# Patient Record
Sex: MALE | Race: WHITE | NOT HISPANIC OR LATINO | Employment: OTHER | URBAN - METROPOLITAN AREA
[De-identification: names, ages, dates, MRNs, and addresses within clinical notes are randomized per-mention and may not be internally consistent; named-entity substitution may affect disease eponyms.]

---

## 2017-02-16 ENCOUNTER — OFFICE VISIT (OUTPATIENT)
Dept: INTERNAL MEDICINE | Facility: CLINIC | Age: 72
End: 2017-02-16

## 2017-02-16 VITALS
BODY MASS INDEX: 28.86 KG/M2 | HEIGHT: 70 IN | DIASTOLIC BLOOD PRESSURE: 88 MMHG | OXYGEN SATURATION: 98 % | SYSTOLIC BLOOD PRESSURE: 140 MMHG | HEART RATE: 83 BPM | WEIGHT: 201.6 LBS

## 2017-02-16 DIAGNOSIS — I10 ESSENTIAL HYPERTENSION: Primary | ICD-10-CM

## 2017-02-16 DIAGNOSIS — R73.01 IMPAIRED FASTING GLUCOSE: ICD-10-CM

## 2017-02-16 DIAGNOSIS — Z00.00 HEALTHCARE MAINTENANCE: ICD-10-CM

## 2017-02-16 DIAGNOSIS — E78.2 MIXED HYPERLIPIDEMIA: ICD-10-CM

## 2017-02-16 DIAGNOSIS — R97.20 ELEVATED PSA, LESS THAN 10 NG/ML: ICD-10-CM

## 2017-02-16 PROCEDURE — 99214 OFFICE O/P EST MOD 30 MIN: CPT | Performed by: INTERNAL MEDICINE

## 2017-02-16 RX ORDER — SIMVASTATIN 20 MG
20 TABLET ORAL DAILY
Qty: 90 TABLET | Refills: 3 | Status: SHIPPED | OUTPATIENT
Start: 2017-02-16 | End: 2017-10-10 | Stop reason: SDUPTHER

## 2017-02-16 RX ORDER — HYDROCHLOROTHIAZIDE 12.5 MG/1
12.5 TABLET ORAL DAILY
Qty: 90 TABLET | Refills: 3 | Status: SHIPPED | OUTPATIENT
Start: 2017-02-16 | End: 2017-10-10 | Stop reason: SDUPTHER

## 2017-02-16 NOTE — PROGRESS NOTES
Subjective     Jung Blount is a 71 y.o. male, who presents with a chief complaint of   Chief Complaint   Patient presents with   • Establish Care     Has had a MRI for prostate issue.       HPI Comments: Elevated PSA: Patient recently saw Dr. Saavedra and did biopsy and showed some atypical cells and then got an MRI of his prostate which showed an indeterminate lesion. The PSA went 2.3-7.3 in the last year. They are seeing Dr. Rivas on the 24th to discuss results and come up with plan for future. 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     HLD: chronic issue and does well on medication. Tolerates well with no muscle cramps or pains.     HTN: chronic issue and runs 120/70-80 at home. Runs high usually at the doctor. He tolerates medication well with no headache or dizziness.        The following portions of the patient's history were reviewed and updated as appropriate: allergies, current medications, past family history, past medical history, past social history, past surgical history and problem list.    Allergies: Review of patient's allergies indicates no known allergies.    Current Outpatient Prescriptions:   •  B-Complex tablet, Take 1 tablet by mouth daily., Disp: , Rfl:   •  hydrochlorothiazide (HYDRODIURIL) 12.5 MG tablet, Take 1 tablet by mouth Daily., Disp: 90 tablet, Rfl: 3  •  Multiple Vitamin (MULTI VITAMIN DAILY PO), Take 1 tablet by mouth daily., Disp: , Rfl:   •  Omega-3 Fatty Acids (FISH OIL) 1000 MG capsule capsule, Take 1,000 mg by mouth daily with breakfast., Disp: , Rfl:   •  simvastatin (ZOCOR) 20 MG tablet, Take 1 tablet by mouth Daily., Disp: 90 tablet, Rfl: 3  Medications Discontinued During This Encounter   Medication Reason   • hydrochlorothiazide (HYDRODIURIL) 12.5 MG tablet Reorder   • simvastatin (ZOCOR) 20 MG tablet Reorder       Review of Systems   Constitutional: Negative for chills and fever.   HENT: Negative for congestion.   "  Respiratory: Negative for cough and shortness of breath.    Cardiovascular: Negative for chest pain and palpitations.   Gastrointestinal: Negative for constipation, diarrhea and vomiting.   Genitourinary: Negative for difficulty urinating and dysuria.   Musculoskeletal: Negative for joint swelling.   Skin: Negative for rash.   Psychiatric/Behavioral: Negative for decreased concentration and sleep disturbance.       Objective     Visit Vitals   • /88 (BP Location: Left arm, Patient Position: Sitting, Cuff Size: Adult)   • Pulse 83   • Ht 70\" (177.8 cm)   • Wt 201 lb 9.6 oz (91.4 kg)   • SpO2 98%   • BMI 28.93 kg/m2         Physical Exam   Constitutional: He is oriented to person, place, and time. He appears well-developed and well-nourished. No distress.   HENT:   Head: Normocephalic and atraumatic.   Right Ear: External ear normal.   Left Ear: External ear normal.   Mouth/Throat: Oropharynx is clear and moist. No oropharyngeal exudate.   Hearing loss    Eyes: Conjunctivae are normal. Right eye exhibits no discharge. Left eye exhibits no discharge. No scleral icterus.   Neck: Neck supple.   Cardiovascular: Normal rate, regular rhythm and normal heart sounds.  Exam reveals no gallop and no friction rub.    No murmur heard.  Pulmonary/Chest: Effort normal and breath sounds normal. No respiratory distress. He has no wheezes. He has no rales.   Lymphadenopathy:     He has no cervical adenopathy.   Neurological: He is alert and oriented to person, place, and time.   Skin: Skin is warm. No rash noted.   Psychiatric: He has a normal mood and affect. His behavior is normal.   Nursing note and vitals reviewed.        Results for orders placed or performed in visit on 09/15/16   Comprehensive Metabolic Panel   Result Value Ref Range    Glucose 113 (H) 65 - 99 mg/dL    BUN 25 (H) 8 - 23 mg/dL    Creatinine 1.23 0.76 - 1.27 mg/dL    eGFR Non African Am 58 (L) >60 mL/min/1.73    eGFR African Am 70 >60 mL/min/1.73    " BUN/Creatinine Ratio 20.3 7.0 - 25.0    Sodium 142 136 - 145 mmol/L    Potassium 5.4 (H) 3.5 - 5.2 mmol/L    Chloride 100 98 - 107 mmol/L    Total CO2 30.0 (H) 22.0 - 29.0 mmol/L    Calcium 9.8 8.6 - 10.5 mg/dL    Total Protein 7.5 6.0 - 8.5 g/dL    Albumin 4.80 3.50 - 5.20 g/dL    Globulin 2.7 gm/dL    A/G Ratio 1.8 g/dL    Total Bilirubin 0.7 0.1 - 1.2 mg/dL    Alkaline Phosphatase 74 39 - 117 U/L    AST (SGOT) 28 1 - 40 U/L    ALT (SGPT) 31 1 - 41 U/L   Lipid Panel   Result Value Ref Range    Total Cholesterol 210 (H) 0 - 200 mg/dL    Triglycerides 148 0 - 150 mg/dL    HDL Cholesterol 69 (H) 40 - 60 mg/dL    VLDL Cholesterol 29.6 5 - 40 mg/dL    LDL Cholesterol  111 (H) 0 - 100 mg/dL   Microalbumin / Creatinine Urine Ratio   Result Value Ref Range    Creatinine, Urine 78.5 Not Estab. mg/dL    Microalbumin, Urine 6.3 Not Estab. ug/mL    Microalbumin/Creatinine Ratio Urine 8.0 0.0 - 30.0 mg/g creat   TSH   Result Value Ref Range    TSH 1.850 0.270 - 4.200 mIU/mL   PSA   Result Value Ref Range    PSA 7.330 (H) 0.000 - 4.000 ng/mL   CBC & Differential   Result Value Ref Range    WBC 7.25 4.50 - 10.70 10*3/mm3    RBC 5.17 4.60 - 6.00 10*6/mm3    Hemoglobin 15.5 13.7 - 17.6 g/dL    Hematocrit 48.8 40.4 - 52.2 %    MCV 94.4 79.8 - 96.2 fL    MCH 30.0 27.0 - 32.7 pg    MCHC 31.8 (L) 32.6 - 36.4 g/dL    RDW 13.8 11.5 - 14.5 %    Platelets 292 140 - 500 10*3/mm3    Neutrophil Rel % 59.2 42.7 - 76.0 %    Lymphocyte Rel % 27.9 19.6 - 45.3 %    Monocyte Rel % 9.2 5.0 - 12.0 %    Eosinophil Rel % 3.0 0.3 - 6.2 %    Basophil Rel % 0.7 0.0 - 1.5 %    Neutrophils Absolute 4.29 1.90 - 8.10 10*3/mm3    Lymphocytes Absolute 2.02 0.90 - 4.80 10*3/mm3    Monocytes Absolute 0.67 0.20 - 1.20 10*3/mm3    Eosinophils Absolute 0.22 0.00 - 0.70 10*3/mm3    Basophils Absolute 0.05 0.00 - 0.20 10*3/mm3    Immature Granulocyte Rel % 0.0 0.0 - 0.5 %    Immature Grans Absolute 0.00 0.00 - 0.03 10*3/mm3       Assessment/Plan   Jung was seen  today for establish care.    Diagnoses and all orders for this visit:    Essential hypertension  -     Comprehensive Metabolic Panel  -     CBC & Differential  -     Urinalysis With / Culture If Indicated  -     CBC & Differential  -     Comprehensive Metabolic Panel    Mixed hyperlipidemia  -     Lipid Panel With LDL / HDL Ratio  -     Lipid Panel With LDL / HDL Ratio    Elevated PSA, less than 10 ng/ml    Healthcare maintenance  -     Urinalysis With / Culture If Indicated  -     TSH Rfx On Abnormal To Free T4    Impaired fasting glucose    Other orders  -     hydrochlorothiazide (HYDRODIURIL) 12.5 MG tablet; Take 1 tablet by mouth Daily.  -     simvastatin (ZOCOR) 20 MG tablet; Take 1 tablet by mouth Daily.    HTN: HTN is under good control and patient will continue to monitor with home BP cuff. No side effects from medications. Will continue current regimen of HCZT. Will follow up in 6 months.     HLD: Patient's cholesterol is under good control. Will continue current regimen of Zocor. No side effects from medications reported. Will follow up labs in 6 months.     Impaired fasting glucose: elevated in past, but patient is healthy weight and exercises regularly.     Elevated PSA: followed by Dr. Saavedra and has MRI results to follow up with him next week. Will have him send me report of discussion.     Will see back in 6 months for follow up and if any other issues before then, will call.     Return in about 6 months (around 8/16/2017) for Recheck of HLD and HTN .    Jackie Yadav MD  02/16/2017

## 2017-03-22 LAB
ALBUMIN SERPL-MCNC: 4.3 G/DL (ref 3.5–5.2)
ALBUMIN/GLOB SERPL: 1.7 G/DL
ALP SERPL-CCNC: 66 U/L (ref 39–117)
ALT SERPL-CCNC: 22 U/L (ref 1–41)
APPEARANCE UR: CLEAR
AST SERPL-CCNC: 26 U/L (ref 1–40)
BACTERIA #/AREA URNS HPF: ABNORMAL /HPF
BASOPHILS # BLD AUTO: 0.04 10*3/MM3 (ref 0–0.2)
BASOPHILS NFR BLD AUTO: 0.6 % (ref 0–1.5)
BILIRUB SERPL-MCNC: 0.6 MG/DL (ref 0.1–1.2)
BILIRUB UR QL STRIP: NEGATIVE
BUN SERPL-MCNC: 20 MG/DL (ref 8–23)
BUN/CREAT SERPL: 18.3 (ref 7–25)
CALCIUM SERPL-MCNC: 9.4 MG/DL (ref 8.6–10.5)
CHLORIDE SERPL-SCNC: 99 MMOL/L (ref 98–107)
CHOLEST SERPL-MCNC: 175 MG/DL (ref 0–200)
CO2 SERPL-SCNC: 27.9 MMOL/L (ref 22–29)
COLOR UR: YELLOW
CREAT SERPL-MCNC: 1.09 MG/DL (ref 0.76–1.27)
EOSINOPHIL # BLD AUTO: 0.18 10*3/MM3 (ref 0–0.7)
EOSINOPHIL NFR BLD AUTO: 2.6 % (ref 0.3–6.2)
EPI CELLS #/AREA URNS HPF: ABNORMAL /HPF
ERYTHROCYTE [DISTWIDTH] IN BLOOD BY AUTOMATED COUNT: 13.8 % (ref 11.5–14.5)
GLOBULIN SER CALC-MCNC: 2.6 GM/DL
GLUCOSE SERPL-MCNC: 103 MG/DL (ref 65–99)
GLUCOSE UR QL: NEGATIVE
HCT VFR BLD AUTO: 45.7 % (ref 40.4–52.2)
HDLC SERPL-MCNC: 58 MG/DL (ref 40–60)
HGB BLD-MCNC: 14.7 G/DL (ref 13.7–17.6)
HGB UR QL STRIP: ABNORMAL
IMM GRANULOCYTES # BLD: 0 10*3/MM3 (ref 0–0.03)
IMM GRANULOCYTES NFR BLD: 0 % (ref 0–0.5)
KETONES UR QL STRIP: NEGATIVE
LDLC SERPL CALC-MCNC: 94 MG/DL (ref 0–100)
LDLC/HDLC SERPL: 1.62 {RATIO}
LEUKOCYTE ESTERASE UR QL STRIP: NEGATIVE
LYMPHOCYTES # BLD AUTO: 2.12 10*3/MM3 (ref 0.9–4.8)
LYMPHOCYTES NFR BLD AUTO: 31 % (ref 19.6–45.3)
MCH RBC QN AUTO: 30.8 PG (ref 27–32.7)
MCHC RBC AUTO-ENTMCNC: 32.2 G/DL (ref 32.6–36.4)
MCV RBC AUTO: 95.6 FL (ref 79.8–96.2)
MICRO URNS: ABNORMAL
MONOCYTES # BLD AUTO: 0.58 10*3/MM3 (ref 0.2–1.2)
MONOCYTES NFR BLD AUTO: 8.5 % (ref 5–12)
MUCOUS THREADS URNS QL MICRO: PRESENT /HPF
NEUTROPHILS # BLD AUTO: 3.92 10*3/MM3 (ref 1.9–8.1)
NEUTROPHILS NFR BLD AUTO: 57.3 % (ref 42.7–76)
NITRITE UR QL STRIP: NEGATIVE
PH UR STRIP: 6.5 [PH] (ref 5–7.5)
PLATELET # BLD AUTO: 293 10*3/MM3 (ref 140–500)
POTASSIUM SERPL-SCNC: 4.4 MMOL/L (ref 3.5–5.2)
PROT SERPL-MCNC: 6.9 G/DL (ref 6–8.5)
PROT UR QL STRIP: NEGATIVE
RBC # BLD AUTO: 4.78 10*6/MM3 (ref 4.6–6)
RBC #/AREA URNS HPF: >30 /HPF
SODIUM SERPL-SCNC: 140 MMOL/L (ref 136–145)
SP GR UR: 1.01 (ref 1–1.03)
TRIGL SERPL-MCNC: 115 MG/DL (ref 0–150)
URINALYSIS REFLEX: ABNORMAL
UROBILINOGEN UR STRIP-MCNC: 0.2 MG/DL (ref 0.2–1)
VLDLC SERPL CALC-MCNC: 23 MG/DL (ref 5–40)
WBC # BLD AUTO: 6.84 10*3/MM3 (ref 4.5–10.7)
WBC #/AREA URNS HPF: ABNORMAL /HPF

## 2017-03-29 ENCOUNTER — TELEPHONE (OUTPATIENT)
Dept: INTERNAL MEDICINE | Facility: CLINIC | Age: 72
End: 2017-03-29

## 2017-03-29 NOTE — TELEPHONE ENCOUNTER
Patient wife has been advised and voiced understanding. The wife states that all of his tests came back normal and he was negative for prostate cancer, great news! PSA was 4.     ----- Message from Jackie Yadav MD sent at 3/22/2017 11:05 PM EDT -----  Please call patient with these results and let him know that his labs look fantastic! He gets an A+. His cholesterol is significantly lower and his fasting blood sugar is almost normal. He has some blood in his urine which is likely related to the biopsy that Dr. Saavedra did. I still haven't received the note from his office regarding his follow up. How are things with his prostate and any news?  I will plan on seeing him in 8/2017 unless he needs something until then.

## 2017-03-29 NOTE — TELEPHONE ENCOUNTER
----- Message from Nati Chávez sent at 3/29/2017 12:29 PM EDT -----  Regarding: FW: ROXANNE PATIENT  Contact: [phone]  Patient called and asked that they be mailed to him, i mailed results today.    ----- Message -----     From: Rupa Jerome     Sent: 3/29/2017   9:09 AM       To: Tami Voss [facility] [place]  Subject: ROXANNE PATIENT                                   :  [birth year]  ROXANNE PATIENT    PATIENT IS UNABLE TO ACCESS LAB RESULTS AT HOME. PLEASE CALL AND TELL PATIENT HIS RESULTS.

## 2017-08-24 ENCOUNTER — OFFICE VISIT (OUTPATIENT)
Dept: INTERNAL MEDICINE | Facility: CLINIC | Age: 72
End: 2017-08-24

## 2017-08-24 VITALS
WEIGHT: 206.2 LBS | SYSTOLIC BLOOD PRESSURE: 138 MMHG | HEIGHT: 70 IN | BODY MASS INDEX: 29.52 KG/M2 | OXYGEN SATURATION: 97 % | HEART RATE: 76 BPM | DIASTOLIC BLOOD PRESSURE: 82 MMHG

## 2017-08-24 DIAGNOSIS — Z12.11 COLON CANCER SCREENING: ICD-10-CM

## 2017-08-24 DIAGNOSIS — Z13.29 SCREENING FOR HYPOTHYROIDISM: ICD-10-CM

## 2017-08-24 DIAGNOSIS — R73.01 IMPAIRED FASTING GLUCOSE: ICD-10-CM

## 2017-08-24 DIAGNOSIS — Z13.6 SCREENING FOR AAA (ABDOMINAL AORTIC ANEURYSM): ICD-10-CM

## 2017-08-24 DIAGNOSIS — I10 ESSENTIAL HYPERTENSION: Primary | ICD-10-CM

## 2017-08-24 DIAGNOSIS — E78.2 MIXED HYPERLIPIDEMIA: ICD-10-CM

## 2017-08-24 DIAGNOSIS — R97.20 ELEVATED PSA, LESS THAN 10 NG/ML: ICD-10-CM

## 2017-08-24 PROBLEM — K57.30 DIVERTICULA OF COLON: Status: ACTIVE | Noted: 2017-08-24

## 2017-08-24 PROCEDURE — 99214 OFFICE O/P EST MOD 30 MIN: CPT | Performed by: INTERNAL MEDICINE

## 2017-08-24 NOTE — PROGRESS NOTES
Subjective     Jung Blount is a 72 y.o. male, who presents with a chief complaint of   Chief Complaint   Patient presents with   • Follow-up     6 MON f/u    • Hypertension       HPI Comments: 71 yo M here for follow up.     HTN is chronic and doing well with no issues. He tolerates well with no side effects. He takes 12.5mg of HCTZ.     HLD is chronic and does well on medication. No side effects. He has not had labs since 2/2017 and did not get prior to visit.     He is followed by Dr. Saavedra for his elevated PSA and this has been stable.     Needs c-scope in June 2018 to be scheduled with Dr. Roman at [place]            The following portions of the patient's history were reviewed and updated as appropriate: allergies, current medications, past family history, past medical history, past social history, past surgical history and problem list.    Allergies: Review of patient's allergies indicates no known allergies.    Current Outpatient Prescriptions:   •  B-Complex tablet, Take 1 tablet by mouth daily., Disp: , Rfl:   •  FOLIC ACID, Take 400 mcg by mouth., Disp: , Rfl:   •  hydrochlorothiazide (HYDRODIURIL) 12.5 MG tablet, Take 1 tablet by mouth Daily., Disp: 90 tablet, Rfl: 3  •  Multiple Vitamin (MULTI VITAMIN DAILY PO), Take 1 tablet by mouth daily., Disp: , Rfl:   •  Omega-3 Fatty Acids (FISH OIL) 1000 MG capsule capsule, Take 1,000 mg by mouth daily with breakfast., Disp: , Rfl:   •  Selenium 200 MCG capsule, Take 200 mcg by mouth., Disp: , Rfl:   •  simvastatin (ZOCOR) 20 MG tablet, Take 1 tablet by mouth Daily., Disp: 90 tablet, Rfl: 3  There are no discontinued medications.    Review of Systems   Constitutional: Negative for chills and fever.   HENT: Negative for congestion.    Respiratory: Negative for cough.    Cardiovascular: Negative for chest pain, palpitations and leg swelling.   Musculoskeletal: Negative for myalgias.   Neurological: Negative for dizziness and headaches.       Objective     BP  "138/82 (BP Location: Left arm, Patient Position: Sitting, Cuff Size: Adult)  Pulse 76  Ht 70\" (177.8 cm)  Wt 206 lb 3.2 oz (93.5 kg)  SpO2 97%  BMI 29.59 kg/m2      Physical Exam   Constitutional: He is oriented to person, place, and time. He appears well-developed and well-nourished. No distress.   HENT:   Head: Normocephalic and atraumatic.   Right Ear: External ear normal.   Left Ear: External ear normal.   Mouth/Throat: Oropharynx is clear and moist. No oropharyngeal exudate.   Eyes: Conjunctivae are normal. Right eye exhibits no discharge. Left eye exhibits no discharge. No scleral icterus.   Neck: Neck supple.   Cardiovascular: Normal rate, regular rhythm and normal heart sounds.  Exam reveals no gallop and no friction rub.    No murmur heard.  Pulmonary/Chest: Effort normal and breath sounds normal. No respiratory distress. He has no wheezes. He has no rales.   Musculoskeletal: He exhibits no edema or tenderness.   Lymphadenopathy:     He has no cervical adenopathy.   Neurological: He is alert and oriented to person, place, and time.   Skin: Skin is warm. No rash noted.   Psychiatric: He has a normal mood and affect. His behavior is normal.   Nursing note and vitals reviewed.        Results for orders placed or performed in visit on 02/16/17   Comprehensive Metabolic Panel   Result Value Ref Range    Glucose 103 (H) 65 - 99 mg/dL    BUN 20 8 - 23 mg/dL    Creatinine 1.09 0.76 - 1.27 mg/dL    eGFR Non African Am 66 >60 mL/min/1.73    eGFR African Am 80 >60 mL/min/1.73    BUN/Creatinine Ratio 18.3 7.0 - 25.0    Sodium 140 136 - 145 mmol/L    Potassium 4.4 3.5 - 5.2 mmol/L    Chloride 99 98 - 107 mmol/L    Total CO2 27.9 22.0 - 29.0 mmol/L    Calcium 9.4 8.6 - 10.5 mg/dL    Total Protein 6.9 6.0 - 8.5 g/dL    Albumin 4.30 3.50 - 5.20 g/dL    Globulin 2.6 gm/dL    A/G Ratio 1.7 g/dL    Total Bilirubin 0.6 0.1 - 1.2 mg/dL    Alkaline Phosphatase 66 39 - 117 U/L    AST (SGOT) 26 1 - 40 U/L    ALT (SGPT) 22 1 - " 41 U/L   Lipid Panel With LDL / HDL Ratio   Result Value Ref Range    Total Cholesterol 175 0 - 200 mg/dL    Triglycerides 115 0 - 150 mg/dL    HDL Cholesterol 58 40 - 60 mg/dL    VLDL Cholesterol 23 5 - 40 mg/dL    LDL Cholesterol  94 0 - 100 mg/dL    LDL/HDL Ratio 1.62    Urinalysis With / Culture If Indicated   Result Value Ref Range    Specific Gravity, UA 1.009 1.005 - 1.030    pH, UA 6.5 5.0 - 7.5    Color, UA Yellow Yellow    Appearance, UA Clear Clear    Leukocytes, UA Negative Negative    Protein Negative Negative/Trace    Glucose, UA Negative Negative    Ketones Negative Negative    Blood, UA 3+ (A) Negative    Bilirubin, UA Negative Negative    Urobilinogen, UA 0.2 0.2 - 1.0 mg/dL    Nitrite, UA Negative Negative    Microscopic Examination See below:     Urinalysis Reflex Comment    Microscopic Examination   Result Value Ref Range    WBC, UA 0-5 0 - 5 /hpf    RBC, UA >30 (A) 0 - 2 /hpf    Epithelial Cells (non renal) None seen 0 - 10 /hpf    Mucus, UA Present Not Estab. /hpf    Bacteria, UA Few None seen/Few /hpf   CBC & Differential   Result Value Ref Range    WBC 6.84 4.50 - 10.70 10*3/mm3    RBC 4.78 4.60 - 6.00 10*6/mm3    Hemoglobin 14.7 13.7 - 17.6 g/dL    Hematocrit 45.7 40.4 - 52.2 %    MCV 95.6 79.8 - 96.2 fL    MCH 30.8 27.0 - 32.7 pg    MCHC 32.2 (L) 32.6 - 36.4 g/dL    RDW 13.8 11.5 - 14.5 %    Platelets 293 140 - 500 10*3/mm3    Neutrophil Rel % 57.3 42.7 - 76.0 %    Lymphocyte Rel % 31.0 19.6 - 45.3 %    Monocyte Rel % 8.5 5.0 - 12.0 %    Eosinophil Rel % 2.6 0.3 - 6.2 %    Basophil Rel % 0.6 0.0 - 1.5 %    Neutrophils Absolute 3.92 1.90 - 8.10 10*3/mm3    Lymphocytes Absolute 2.12 0.90 - 4.80 10*3/mm3    Monocytes Absolute 0.58 0.20 - 1.20 10*3/mm3    Eosinophils Absolute 0.18 0.00 - 0.70 10*3/mm3    Basophils Absolute 0.04 0.00 - 0.20 10*3/mm3    Immature Granulocyte Rel % 0.0 0.0 - 0.5 %    Immature Grans Absolute 0.00 0.00 - 0.03 10*3/mm3       Assessment/Plan   Jung was seen today  for follow-up and hypertension.    Diagnoses and all orders for this visit:    Essential hypertension  -     CBC & Differential  -     Comprehensive Metabolic Panel    Mixed hyperlipidemia  -     Lipid Panel With LDL / HDL Ratio    Impaired fasting glucose  -     Hemoglobin A1c  -     Urinalysis With / Culture If Indicated    Elevated PSA, less than 10 ng/ml    Screening for hypothyroidism  -     TSH Rfx On Abnormal To Free T4    Screening for AAA (abdominal aortic aneurysm)  -     US AAA Screen Limited    Colon cancer screening  -     Ambulatory Referral For Screening Colonoscopy      HTN is under good control and patient will continue to monitor with home BP cuff. No side effects from medications. Will continue current regimen of HCZT. Will follow up in 6 months      Patient's cholesterol is under good control. Will continue current regimen of Simvastatin. No side effects from medications reported. Will follow up in 6 months to monitor levels.     Patient's BG's have been elevated. Will continue to focus on diet and exercise. Will follow up in 6 months. Labs today and will call with results.     AWV in October and then will have follow up in 6 months with labs at [facility] prior     Dr. Saavedra following elevated PSA closely. Follow up scheduled.     AAA screening to be done when he returns for follow up.     Return in about 5 weeks (around 10/1/2017) for Medicare Wellness.    Jackie Yadav MD  08/24/2017

## 2017-08-25 LAB
ALBUMIN SERPL-MCNC: 4.4 G/DL (ref 3.5–5.2)
ALBUMIN/GLOB SERPL: 1.5 G/DL
ALP SERPL-CCNC: 78 U/L (ref 40–129)
ALT SERPL-CCNC: 35 U/L (ref 5–41)
APPEARANCE UR: CLEAR
AST SERPL-CCNC: 36 U/L (ref 5–40)
BACTERIA #/AREA URNS HPF: NORMAL /HPF
BASOPHILS # BLD AUTO: 0.07 10*3/MM3 (ref 0–0.2)
BASOPHILS NFR BLD AUTO: 0.9 % (ref 0–2)
BILIRUB SERPL-MCNC: 0.6 MG/DL (ref 0.2–1.2)
BILIRUB UR QL STRIP: NEGATIVE
BUN SERPL-MCNC: 18 MG/DL (ref 8–23)
BUN/CREAT SERPL: 17.1 (ref 7–25)
CALCIUM SERPL-MCNC: 9.2 MG/DL (ref 8.8–10.5)
CHLORIDE SERPL-SCNC: 99 MMOL/L (ref 98–107)
CHOLEST SERPL-MCNC: 214 MG/DL (ref 0–200)
CO2 SERPL-SCNC: 26.3 MMOL/L (ref 22–29)
COLOR UR: YELLOW
CREAT SERPL-MCNC: 1.05 MG/DL (ref 0.76–1.27)
EOSINOPHIL # BLD AUTO: 0.22 10*3/MM3 (ref 0.1–0.3)
EOSINOPHIL NFR BLD AUTO: 2.7 % (ref 0–4)
EPI CELLS #/AREA URNS HPF: NORMAL /HPF
ERYTHROCYTE [DISTWIDTH] IN BLOOD BY AUTOMATED COUNT: 13.1 % (ref 11.5–14.5)
GLOBULIN SER CALC-MCNC: 3 GM/DL
GLUCOSE SERPL-MCNC: 106 MG/DL (ref 65–99)
GLUCOSE UR QL: NEGATIVE
HBA1C MFR BLD: 6 % (ref 4.8–5.6)
HCT VFR BLD AUTO: 48.1 % (ref 42–52)
HDLC SERPL-MCNC: 65 MG/DL (ref 40–60)
HGB BLD-MCNC: 15.9 G/DL (ref 14–18)
HGB UR QL STRIP: NEGATIVE
IMM GRANULOCYTES # BLD: 0.02 10*3/MM3 (ref 0–0.03)
IMM GRANULOCYTES NFR BLD: 0.2 % (ref 0–0.5)
KETONES UR QL STRIP: NEGATIVE
LDLC SERPL CALC-MCNC: 106 MG/DL (ref 0–100)
LDLC/HDLC SERPL: 1.63 {RATIO}
LEUKOCYTE ESTERASE UR QL STRIP: NEGATIVE
LYMPHOCYTES # BLD AUTO: 1.97 10*3/MM3 (ref 0.6–4.8)
LYMPHOCYTES NFR BLD AUTO: 24.6 % (ref 20–45)
MCH RBC QN AUTO: 30.5 PG (ref 27–31)
MCHC RBC AUTO-ENTMCNC: 33.1 G/DL (ref 31–37)
MCV RBC AUTO: 92.3 FL (ref 80–94)
MICRO URNS: ABNORMAL
MICRO URNS: ABNORMAL
MONOCYTES # BLD AUTO: 0.65 10*3/MM3 (ref 0–1)
MONOCYTES NFR BLD AUTO: 8.1 % (ref 3–8)
NEUTROPHILS # BLD AUTO: 5.08 10*3/MM3 (ref 1.5–8.3)
NEUTROPHILS NFR BLD AUTO: 63.5 % (ref 45–70)
NITRITE UR QL STRIP: NEGATIVE
NRBC BLD AUTO-RTO: 0 /100 WBC (ref 0–0)
PH UR STRIP: 6.5 [PH] (ref 5–7.5)
PLATELET # BLD AUTO: 316 10*3/MM3 (ref 140–500)
POTASSIUM SERPL-SCNC: 4.4 MMOL/L (ref 3.5–5.2)
PROT SERPL-MCNC: 7.4 G/DL (ref 6–8.5)
PROT UR QL STRIP: NEGATIVE
RBC # BLD AUTO: 5.21 10*6/MM3 (ref 4.7–6.1)
RBC #/AREA URNS HPF: NORMAL /HPF
SODIUM SERPL-SCNC: 139 MMOL/L (ref 136–145)
SP GR UR: <=1.005 (ref 1–1.03)
TRIGL SERPL-MCNC: 216 MG/DL (ref 0–150)
TSH SERPL DL<=0.005 MIU/L-ACNC: 1.63 MIU/ML (ref 0.27–4.2)
URINALYSIS REFLEX: ABNORMAL
UROBILINOGEN UR STRIP-MCNC: 0.2 MG/DL (ref 0.2–1)
VLDLC SERPL CALC-MCNC: 43.2 MG/DL (ref 8–32)
WBC # BLD AUTO: 8.01 10*3/MM3 (ref 4.8–10.8)
WBC #/AREA URNS HPF: NORMAL /HPF

## 2017-08-25 NOTE — PROGRESS NOTES
Please call wife with results and let her know that labs are pretty stable. Her  does have a slight increase in his TG's and LDL which we will talk about at follow up. Reduce sweets, carbs, processed foods and foods high in saturated fat. Pre-diabetes is stable. We will go over all of these at their physical though. No changes currently to medications.

## 2017-08-29 ENCOUNTER — TELEPHONE (OUTPATIENT)
Dept: INTERNAL MEDICINE | Facility: CLINIC | Age: 72
End: 2017-08-29

## 2017-08-29 NOTE — TELEPHONE ENCOUNTER
Patients wife notified, they have seen labs on mychart, will go over with Dr. Yadav at his appt.  ----- Message from Jackie Yadav MD sent at 8/25/2017 10:31 AM EDT -----  Please call wife with results and let her know that labs are pretty stable. Her  does have a slight increase in his TG's and LDL which we will talk about at follow up. Reduce sweets, carbs, processed foods and foods high in saturated fat. Pre-diabetes is stable. We will go over all of these at their physical though. No changes currently to medications.

## 2017-10-10 ENCOUNTER — HOSPITAL ENCOUNTER (OUTPATIENT)
Dept: ULTRASOUND IMAGING | Facility: HOSPITAL | Age: 72
Discharge: HOME | End: 2017-10-10
Attending: INTERNAL MEDICINE | Admitting: INTERNAL MEDICINE

## 2017-10-10 ENCOUNTER — OFFICE VISIT (OUTPATIENT)
Dept: INTERNAL MEDICINE | Facility: CLINIC | Age: 72
End: 2017-10-10

## 2017-10-10 VITALS
BODY MASS INDEX: 30.06 KG/M2 | HEART RATE: 85 BPM | OXYGEN SATURATION: 98 % | WEIGHT: 210 LBS | SYSTOLIC BLOOD PRESSURE: 132 MMHG | HEIGHT: 70 IN | DIASTOLIC BLOOD PRESSURE: 76 MMHG

## 2017-10-10 DIAGNOSIS — I77.819 AORTIC DILATATION (HCC): ICD-10-CM

## 2017-10-10 DIAGNOSIS — R97.20 ELEVATED PSA, LESS THAN 10 NG/ML: ICD-10-CM

## 2017-10-10 DIAGNOSIS — R41.3 MEMORY DEFICITS: ICD-10-CM

## 2017-10-10 DIAGNOSIS — I10 ESSENTIAL HYPERTENSION: Primary | ICD-10-CM

## 2017-10-10 DIAGNOSIS — E78.2 MIXED HYPERLIPIDEMIA: ICD-10-CM

## 2017-10-10 DIAGNOSIS — R41.3 MEMORY DEFICIT: ICD-10-CM

## 2017-10-10 DIAGNOSIS — Z00.00 MEDICARE ANNUAL WELLNESS VISIT, SUBSEQUENT: Primary | ICD-10-CM

## 2017-10-10 DIAGNOSIS — R73.01 IMPAIRED FASTING GLUCOSE: ICD-10-CM

## 2017-10-10 PROCEDURE — 96160 PT-FOCUSED HLTH RISK ASSMT: CPT | Performed by: INTERNAL MEDICINE

## 2017-10-10 PROCEDURE — G0439 PPPS, SUBSEQ VISIT: HCPCS | Performed by: INTERNAL MEDICINE

## 2017-10-10 PROCEDURE — 76706 US ABDL AORTA SCREEN AAA: CPT

## 2017-10-10 PROCEDURE — 99214 OFFICE O/P EST MOD 30 MIN: CPT | Performed by: INTERNAL MEDICINE

## 2017-10-10 RX ORDER — SIMVASTATIN 20 MG
20 TABLET ORAL NIGHTLY
Qty: 90 TABLET | Refills: 3 | Status: SHIPPED | OUTPATIENT
Start: 2017-10-10 | End: 2018-10-16 | Stop reason: SDUPTHER

## 2017-10-10 RX ORDER — HYDROCHLOROTHIAZIDE 12.5 MG/1
12.5 TABLET ORAL DAILY
Qty: 90 TABLET | Refills: 3 | Status: SHIPPED | OUTPATIENT
Start: 2017-10-10 | End: 2018-10-16

## 2017-10-10 NOTE — PATIENT INSTRUCTIONS
"Food Choices to Lower Your Triglycerides  Triglycerides are a type of fat in your blood. High levels of triglycerides can increase the risk of heart disease and stroke. If your triglyceride levels are high, the foods you eat and your eating habits are very important. Choosing the right foods can help lower your triglycerides.   WHAT GENERAL GUIDELINES DO I NEED TO FOLLOW?  · Lose weight if you are overweight.    · Limit or avoid alcohol.    · Fill one half of your plate with vegetables and green salads.    · Limit fruit to two servings a day. Choose fruit instead of juice.    · Make one fourth of your plate whole grains. Look for the word \"whole\" as the first word in the ingredient list.  · Fill one fourth of your plate with lean protein foods.  · Enjoy fatty fish (such as salmon, mackerel, sardines, and tuna) three times a week.    · Choose healthy fats.    · Limit foods high in starch and sugar.  · Eat more home-cooked food and less restaurant, buffet, and fast food.  · Limit fried foods.  · Cook foods using methods other than frying.  · Limit saturated fats.  · Check ingredient lists to avoid foods with partially hydrogenated oils (trans fats) in them.  WHAT FOODS CAN I EAT?   Grains  Whole grains, such as whole wheat or whole grain breads, crackers, cereals, and pasta. Unsweetened oatmeal, bulgur, barley, quinoa, or brown rice. Corn or whole wheat flour tortillas.   Vegetables  Fresh or frozen vegetables (raw, steamed, roasted, or grilled). Green salads.  Fruits  All fresh, canned (in natural juice), or frozen fruits.  Meat and Other Protein Products  Ground beef (85% or leaner), grass-fed beef, or beef trimmed of fat. Skinless chicken or turkey. Ground chicken or turkey. Pork trimmed of fat. All fish and seafood. Eggs. Dried beans, peas, or lentils. Unsalted nuts or seeds. Unsalted canned or dry beans.  Dairy  Low-fat dairy products, such as skim or 1% milk, 2% or reduced-fat cheeses, low-fat ricotta or cottage " cheese, or plain low-fat yogurt.  Fats and Oils  Tub margarines without trans fats. Light or reduced-fat mayonnaise and salad dressings. Avocado. Safflower, olive, or canola oils. Natural peanut or almond butter.  The items listed above may not be a complete list of recommended foods or beverages. Contact your dietitian for more options.  WHAT FOODS ARE NOT RECOMMENDED?   Grains  White bread. White pasta. White rice. Cornbread. Bagels, pastries, and croissants. Crackers that contain trans fat.  Vegetables  White potatoes. Corn. Creamed or fried vegetables. Vegetables in a cheese sauce.  Fruits  Dried fruits. Canned fruit in light or heavy syrup. Fruit juice.  Meat and Other Protein Products  Fatty cuts of meat. Ribs, chicken wings, buitrago, sausage, bologna, salami, chitterlings, fatback, hot dogs, bratwurst, and packaged luncheon meats.  Dairy  Whole or 2% milk, cream, half-and-half, and cream cheese. Whole-fat or sweetened yogurt. Full-fat cheeses. Nondairy creamers and whipped toppings. Processed cheese, cheese spreads, or cheese curds.  Sweets and Desserts  Corn syrup, sugars, honey, and molasses. Candy. Jam and jelly. Syrup. Sweetened cereals. Cookies, pies, cakes, donuts, muffins, and ice cream.  Fats and Oils  Butter, stick margarine, lard, shortening, ghee, or buitrago fat. Coconut, palm kernel, or palm oils.  Beverages  Alcohol. Sweetened drinks (such as sodas, lemonade, and fruit drinks or punches).  The items listed above may not be a complete list of foods and beverages to avoid. Contact your dietitian for more information.     This information is not intended to replace advice given to you by your health care provider. Make sure you discuss any questions you have with your health care provider.     Document Released: 10/05/2005 Document Revised: 01/08/2016 Document Reviewed: 10/22/2014  [employer] Interactive Patient Education ©2017 [employer]    Medicare Wellness  Personal Prevention Plan of Service      Date of Office Visit:  10/10/2017  Encounter Provider:  Jackie Yadav MD  Place of Service:  [facility]  Patient Name: Jung Blount  :  1945    As part of the Medicare Wellness portion of your visit today, we are providing you with this personalized preventive plan of services (PPPS). This plan is based upon recommendations of the [place] Preventive Services Task Force (USPSTF) and the Advisory Committee on Immunization Practices (ACIP).    This lists the preventive care services that should be considered, and provides dates of when you are due. Items listed as completed are up-to-date and do not require any further intervention.    Health Maintenance   Topic Date Due   • ZOSTER VACCINE  09/15/2016   • INFLUENZA VACCINE  2017   • COLONOSCOPY  2018   • LIPID PANEL  2018   • MEDICARE ANNUAL WELLNESS  10/10/2018   • TDAP/TD VACCINES (2 - Td) 2027   • PNEUMOCOCCAL VACCINES (65+ LOW/MEDIUM RISK)  Completed   • AAA SCREEN (ONE-TIME)  Completed       No orders of the defined types were placed in this encounter.      Return in about 6 months (around 4/10/2018) for Recheck.

## 2017-10-10 NOTE — PROGRESS NOTES
QUICK REFERENCE INFORMATION:  The ABCs of the Annual Wellness Visit    Subsequent Medicare Wellness Visit    HEALTH RISK ASSESSMENT    1945    Recent Hospitalizations:  No hospitalization(s) within the last year..        Current Medical Providers:  Patient Care Team:  Jackie Yadav MD as PCP - General (Internal Medicine)  Martin Saavedra MD as Consulting Physician (Urology)        Smoking Status:  History   Smoking Status   • Former Smoker   • Packs/day: 2.00   • Years: 47.00   Smokeless Tobacco   • Not on file     Comment: quit July 2007       Alcohol Consumption:  History   Alcohol Use   • Yes     Comment: Treated for alcoholism in 1991       Depression Screen:   PHQ-2/PHQ-9 Depression Screening 10/10/2017   Little interest or pleasure in doing things 0   Feeling down, depressed, or hopeless 0   Total Score 0       Health Habits and Functional and Cognitive Screening:  Functional & Cognitive Status 10/10/2017   Do you have difficulty preparing food and eating? No   Do you have difficulty bathing yourself? No   Do you have difficulty getting dressed? No   Do you have difficulty using the toilet? No   Do you have difficulty moving around from place to place? No   In the past year have you fallen or experienced a near fall? No   Do you need help using the phone?  No   Are you deaf or do you have serious difficulty hearing?  Yes   Do you need help with transportation? No   Do you need help shopping? No   Do you need help preparing meals?  No   Do you need help with housework?  No   Do you need help with laundry? No   Do you need help taking your medications? No   Do you need help managing money? No   Do you have difficulty concentrating, remembering or making decisions? No       Health Habits  Current Diet: Well Balanced Diet  Dental Exam: Up to date  Eye Exam: Up to date  Exercise (times per week): 5 times per week  Current Exercise Activities Include: (Gym, walking)      Does the patient have evidence of  cognitive impairment? No, per screening, but wife feels that he is forgetful at times     Aspirin use counseling: too late to start for primary prevention. Higher risks of bleeding.       Recent Lab Results:  CMP:  Lab Results   Component Value Date     (H) 08/24/2017    BUN 18 08/24/2017    CREATININE 1.05 08/24/2017    EGFRIFNONA 69 08/24/2017    EGFRIFAFRI 84 08/24/2017    BCR 17.1 08/24/2017     08/24/2017    K 4.4 08/24/2017    CO2 26.3 08/24/2017    CALCIUM 9.2 08/24/2017    PROTENTOTREF 7.4 08/24/2017    ALBUMIN 4.40 08/24/2017    LABGLOBREF 3.0 08/24/2017    LABIL2 1.5 08/24/2017    BILITOT 0.6 08/24/2017    ALKPHOS 78 08/24/2017    AST 36 08/24/2017    ALT 35 08/24/2017     Lipid Panel:  Lab Results   Component Value Date    TRIG 216 (H) 08/24/2017    HDL 65 (H) 08/24/2017    VLDL 43.2 (H) 08/24/2017    LDLHDL 1.63 08/24/2017     HbA1c:  Lab Results   Component Value Date    HGBA1C 6.00 (H) 08/24/2017       Visual Acuity:  No exam data present    Age-appropriate Screening Schedule:  Refer to the list below for future screening recommendations based on patient's age, sex and/or medical conditions. Orders for these recommended tests are listed in the plan section. The patient has been provided with a written plan.    Health Maintenance   Topic Date Due   • ZOSTER VACCINE  09/15/2016   • INFLUENZA VACCINE  08/01/2017   • COLONOSCOPY  06/01/2018   • LIPID PANEL  08/24/2018   • TDAP/TD VACCINES (2 - Td) 08/24/2027   • PNEUMOCOCCAL VACCINES (65+ LOW/MEDIUM RISK)  Completed        Subjective   History of Present Illness    He has had elevated PSA in the past and has been followed by Dr. Saavedra and is going to establish care with Dr. Epperson. Biopsy was all normal with Dr. Saavedra, but needs follow up. Wife to schedule today.     His HTN is chronic and he is doing well. He tolerates diuretic well. He does not have CP or SOB.    His HLD is a chronic issue and he sometimes misses his Zocor. His TG's are up  today on his labs and he has been eating a lot more ice cream and sweets. He has gained weight.     His wife thinks that he has had trouble with his memory lately particularly with places, but not people. Also issues with dates and remembering that he has done something.       Jung Blount is a 72 y.o. male who presents for an Subsequent Wellness Visit.    The following portions of the patient's history were reviewed and updated as appropriate: allergies, current medications, past family history, past medical history, past social history, past surgical history and problem list.    Outpatient Medications Prior to Visit   Medication Sig Dispense Refill   • B-Complex tablet Take 1 tablet by mouth daily.     • Multiple Vitamin (MULTI VITAMIN DAILY PO) Take 1 tablet by mouth daily.     • Omega-3 Fatty Acids (FISH OIL) 1000 MG capsule capsule Take 1,000 mg by mouth daily with breakfast.     • Selenium 200 MCG capsule Take 200 mcg by mouth.     • hydrochlorothiazide (HYDRODIURIL) 12.5 MG tablet Take 1 tablet by mouth Daily. 90 tablet 3   • simvastatin (ZOCOR) 20 MG tablet Take 1 tablet by mouth Daily. 90 tablet 3   • FOLIC ACID Take 400 mcg by mouth.       No facility-administered medications prior to visit.        Patient Active Problem List   Diagnosis   • Hypertension   • Hyperlipidemia   • Medicare annual wellness visit, subsequent   • Elevated PSA, less than 10 ng/ml   • Impaired fasting glucose   • [place] (hard of hearing)   • Diverticula of colon   • Aortic dilatation   • Memory deficits       Advance Care Planning:  has an advance directive - a copy HAS NOT been provided. Have asked the patient to send this to us to add to record.    Identification of Risk Factors:  Risk factors include: weight , unhealthy diet, cardiovascular risk, cognitive impairment, hearing limitations and polypharmacy.    Review of Systems   Constitutional: Negative for chills and fever.   HENT: Negative for congestion.    Respiratory:  Negative for shortness of breath.    Cardiovascular: Negative for chest pain.   Gastrointestinal: Negative for constipation, diarrhea, nausea and vomiting.   Skin: Negative for rash.   Neurological: Negative for dizziness, light-headedness and headaches.        Memory issues   Hematological: Negative for adenopathy.       Compared to one year ago, the patient feels his physical health is the same.  Compared to one year ago, the patient feels his mental health is the same.    Objective     Physical Exam   Constitutional: He is oriented to person, place, and time. He appears well-developed and well-nourished. No distress.   HENT:   Head: Normocephalic and atraumatic.   Right Ear: External ear normal. Decreased hearing is noted.   Left Ear: External ear normal. Decreased hearing is noted.   Mouth/Throat: Uvula is midline, oropharynx is clear and moist and mucous membranes are normal. No oropharyngeal exudate. Tonsils are 0 on the right. Tonsils are 0 on the left. No tonsillar exudate.   Dry skin in ear canal    Eyes: Conjunctivae are normal. Right eye exhibits no discharge. Left eye exhibits no discharge. No scleral icterus.   Neck: Trachea normal. Neck supple. Carotid bruit is not present. No thyroid mass and no thyromegaly present.   Cardiovascular: Normal rate, regular rhythm and normal heart sounds.  Exam reveals no gallop and no friction rub.    No murmur heard.  Pulmonary/Chest: Effort normal and breath sounds normal. No respiratory distress. He has no wheezes. He has no rales.   Abdominal: Soft. Bowel sounds are normal. He exhibits no distension and no mass. There is no tenderness. There is no guarding.   Lymphadenopathy:     He has no cervical adenopathy.   Neurological: He is alert and oriented to person, place, and time. No sensory deficit. He exhibits normal muscle tone. Coordination normal.   Skin: Skin is warm. No rash noted.   Psychiatric: He has a normal mood and affect. His behavior is normal. His mood  "appears not anxious. His affect is not blunt. Cognition and memory are not impaired. He does not express impulsivity or inappropriate judgment. He exhibits normal recent memory and normal remote memory.   Nursing note and vitals reviewed.      Vitals:    10/10/17 1007   BP: 132/76   BP Location: Left arm   Patient Position: Sitting   Cuff Size: Adult   Pulse: 85   SpO2: 98%   Weight: 210 lb (95.3 kg)   Height: 70\" (177.8 cm)   PainSc: 0-No pain       Body mass index is 30.13 kg/(m^2).  Discussed the patient's BMI with him. The BMI is above average; BMI management plan is completed.    Assessment/Plan   Patient Self-Management and Personalized Health Advice  The patient has been provided with information about: diet, exercise, weight management, prevention of cardiac or vascular disease, designing advance directives, supplements and mental health concerns and preventive services including:   · Advance directive, Diabetes screening, see lab orders, Exercise counseling provided, Fall Risk assessment done, Fall Risk plan of care done, Screening for AAA, referral for ultrasound placed, Zostavax vaccine (Herpes Zoster).    Visit Diagnoses:    ICD-10-CM ICD-9-CM   1. Medicare annual wellness visit, subsequent Z00.00 V70.0   2. Memory deficits R41.3 780.93   3. Aortic dilatation I77.819 447.70   4. Elevated PSA, less than 10 ng/ml R97.20 790.93       Orders Placed This Encounter   Procedures   • CT Head Without Contrast     Scheduling Instructions:      [facility]. Please check cost of MRI and if roughly the same, I would prefer the MRI     Order Specific Question:   Reason for Exam:     Answer:   memory deficit       Outpatient Encounter Prescriptions as of 10/10/2017   Medication Sig Dispense Refill   • B-Complex tablet Take 1 tablet by mouth daily.     • hydrochlorothiazide (HYDRODIURIL) 12.5 MG tablet Take 1 tablet by mouth Daily. 90 tablet 3   • Multiple Vitamin (MULTI VITAMIN DAILY PO) " Take 1 tablet by mouth daily.     • Omega-3 Fatty Acids (FISH OIL) 1000 MG capsule capsule Take 1,000 mg by mouth daily with breakfast.     • Selenium 200 MCG capsule Take 200 mcg by mouth.     • simvastatin (ZOCOR) 20 MG tablet Take 1 tablet by mouth Every Night. 90 tablet 3   • [DISCONTINUED] hydrochlorothiazide (HYDRODIURIL) 12.5 MG tablet Take 1 tablet by mouth Daily. 90 tablet 3   • [DISCONTINUED] simvastatin (ZOCOR) 20 MG tablet Take 1 tablet by mouth Daily. 90 tablet 3   • zoster vaccine live (ZOSTAVAX) 21988 UNT/0.65ML reconstituted suspension Inject 19,400 Units under the skin 1 (One) Time for 1 dose. 1 each 0   • [DISCONTINUED] FOLIC ACID Take 400 mcg by mouth.       No facility-administered encounter medications on file as of 10/10/2017.        Reviewed use of high risk medication in the elderly: yes  Reviewed for potential of harmful drug interactions in the elderly: yes    Follow Up:  Return in about 6 months (around 4/10/2018) for Next scheduled follow up.     An After Visit Summary and PPPS with all of these plans were given to the patient.           Patient scored a 26/30 on his MMSE which is borderline. He has been on Aricept in past, but not had workup. With history of alzheimer's in father and alcohol abuse in past, will workup and consider Donepezil in future. Will order CT scan of head (MRI if it is just as expensive at [facility]). TSH is normal and will check B12 with next lab draw. Encouraged him to do memory challenges and puzzles at home.     Patient's cholesterol is not under good control as his TG's are high. Will continue current regimen of Zocor and Omega 3 and work on reducing sweets and high fat foods. No side effects from medications reported. Will follow up in 6 months to monitor levels.     HTN is under good control and patient will continue to monitor with home BP cuff. No side effects from medications. Will continue current regimen of HCTZ. Will follow up in 6 months.      Infrarenal dilation is mild. Will recheck u/s in 2 years. Keep BP under good control and he will alert me if symptoms arise.     All other health maintenance is up to date. Needs flu shot that he will get at wife's office as well as Shingles which I gave them prescription for today.     He will follow up with Dr. Epperson for his elevated PSA. Wife to schedule appointment.

## 2018-03-28 LAB
ALBUMIN SERPL-MCNC: 4.3 G/DL (ref 3.5–5.2)
ALBUMIN/CREAT UR: 3.3 MG/G CREAT (ref 0–30)
ALBUMIN/GLOB SERPL: 1.6 G/DL
ALP SERPL-CCNC: 82 U/L (ref 39–117)
ALT SERPL-CCNC: 32 U/L (ref 1–41)
APPEARANCE UR: CLEAR
AST SERPL-CCNC: 30 U/L (ref 1–40)
BACTERIA #/AREA URNS HPF: ABNORMAL /HPF
BASOPHILS # BLD AUTO: 0.08 10*3/MM3 (ref 0–0.2)
BASOPHILS NFR BLD AUTO: 0.9 % (ref 0–1.5)
BILIRUB SERPL-MCNC: 0.7 MG/DL (ref 0.1–1.2)
BILIRUB UR QL STRIP: NEGATIVE
BUN SERPL-MCNC: 24 MG/DL (ref 8–23)
BUN/CREAT SERPL: 19.2 (ref 7–25)
CALCIUM SERPL-MCNC: 9.9 MG/DL (ref 8.6–10.5)
CASTS URNS MICRO: ABNORMAL
CASTS URNS QL MICRO: PRESENT /LPF
CHLORIDE SERPL-SCNC: 99 MMOL/L (ref 98–107)
CHOLEST SERPL-MCNC: 198 MG/DL (ref 0–200)
CO2 SERPL-SCNC: 28.3 MMOL/L (ref 22–29)
COLOR UR: YELLOW
CREAT SERPL-MCNC: 1.25 MG/DL (ref 0.76–1.27)
CREAT UR-MCNC: 138.3 MG/DL
EOSINOPHIL # BLD AUTO: 0.26 10*3/MM3 (ref 0–0.7)
EOSINOPHIL NFR BLD AUTO: 2.9 % (ref 0.3–6.2)
EPI CELLS #/AREA URNS HPF: ABNORMAL /HPF
ERYTHROCYTE [DISTWIDTH] IN BLOOD BY AUTOMATED COUNT: 14 % (ref 11.5–14.5)
GFR SERPLBLD CREATININE-BSD FMLA CKD-EPI: 56 ML/MIN/1.73
GFR SERPLBLD CREATININE-BSD FMLA CKD-EPI: 68 ML/MIN/1.73
GLOBULIN SER CALC-MCNC: 2.7 GM/DL
GLUCOSE SERPL-MCNC: 118 MG/DL (ref 65–99)
GLUCOSE UR QL: NEGATIVE
HBA1C MFR BLD: 5.4 % (ref 4.8–5.6)
HCT VFR BLD AUTO: 48.1 % (ref 40.4–52.2)
HDLC SERPL-MCNC: 54 MG/DL (ref 40–60)
HGB BLD-MCNC: 14.7 G/DL (ref 13.7–17.6)
HGB UR QL STRIP: NEGATIVE
IMM GRANULOCYTES # BLD: 0.02 10*3/MM3 (ref 0–0.03)
IMM GRANULOCYTES NFR BLD: 0.2 % (ref 0–0.5)
KETONES UR QL STRIP: NEGATIVE
LDLC SERPL CALC-MCNC: 114 MG/DL (ref 0–100)
LDLC/HDLC SERPL: 2.11 {RATIO}
LEUKOCYTE ESTERASE UR QL STRIP: NEGATIVE
LYMPHOCYTES # BLD AUTO: 2.54 10*3/MM3 (ref 0.9–4.8)
LYMPHOCYTES NFR BLD AUTO: 28.2 % (ref 19.6–45.3)
MCH RBC QN AUTO: 30.3 PG (ref 27–32.7)
MCHC RBC AUTO-ENTMCNC: 30.6 G/DL (ref 32.6–36.4)
MCV RBC AUTO: 99.2 FL (ref 79.8–96.2)
MICRO URNS: NORMAL
MICRO URNS: NORMAL
MICROALBUMIN UR-MCNC: 4.5 UG/ML
MONOCYTES # BLD AUTO: 0.71 10*3/MM3 (ref 0.2–1.2)
MONOCYTES NFR BLD AUTO: 7.9 % (ref 5–12)
MUCOUS THREADS URNS QL MICRO: PRESENT /HPF
NEUTROPHILS # BLD AUTO: 5.39 10*3/MM3 (ref 1.9–8.1)
NEUTROPHILS NFR BLD AUTO: 59.9 % (ref 42.7–76)
NITRITE UR QL STRIP: NEGATIVE
PH UR STRIP: 5 [PH] (ref 5–7.5)
PLATELET # BLD AUTO: 316 10*3/MM3 (ref 140–500)
POTASSIUM SERPL-SCNC: 4.7 MMOL/L (ref 3.5–5.2)
PROT SERPL-MCNC: 7 G/DL (ref 6–8.5)
PROT UR QL STRIP: NEGATIVE
RBC # BLD AUTO: 4.85 10*6/MM3 (ref 4.6–6)
RBC #/AREA URNS HPF: ABNORMAL /HPF
SODIUM SERPL-SCNC: 141 MMOL/L (ref 136–145)
SP GR UR: 1.02 (ref 1–1.03)
TRIGL SERPL-MCNC: 151 MG/DL (ref 0–150)
URINALYSIS REFLEX: NORMAL
UROBILINOGEN UR STRIP-MCNC: 0.2 MG/DL (ref 0.2–1)
VIT B12 SERPL-MCNC: 669 PG/ML (ref 211–946)
VLDLC SERPL CALC-MCNC: 30.2 MG/DL (ref 5–40)
WBC # BLD AUTO: 9 10*3/MM3 (ref 4.5–10.7)
WBC #/AREA URNS HPF: ABNORMAL /HPF

## 2018-04-03 ENCOUNTER — OFFICE VISIT (OUTPATIENT)
Dept: INTERNAL MEDICINE | Facility: CLINIC | Age: 73
End: 2018-04-03

## 2018-04-03 VITALS
DIASTOLIC BLOOD PRESSURE: 76 MMHG | OXYGEN SATURATION: 97 % | HEIGHT: 70 IN | RESPIRATION RATE: 16 BRPM | HEART RATE: 89 BPM | WEIGHT: 210 LBS | BODY MASS INDEX: 30.06 KG/M2 | SYSTOLIC BLOOD PRESSURE: 132 MMHG

## 2018-04-03 DIAGNOSIS — L57.0 AK (ACTINIC KERATOSIS): ICD-10-CM

## 2018-04-03 DIAGNOSIS — E78.2 MIXED HYPERLIPIDEMIA: ICD-10-CM

## 2018-04-03 DIAGNOSIS — I10 ESSENTIAL HYPERTENSION: Primary | ICD-10-CM

## 2018-04-03 DIAGNOSIS — R73.01 IMPAIRED FASTING GLUCOSE: ICD-10-CM

## 2018-04-03 PROCEDURE — 99214 OFFICE O/P EST MOD 30 MIN: CPT | Performed by: INTERNAL MEDICINE

## 2018-04-03 NOTE — PROGRESS NOTES
Jung Blount is a 73 y.o. male, who presents with a chief complaint of   Chief Complaint   Patient presents with   • Follow-up     6 month f/u, lab results   • Hypertension       74 yo M here for follow up of HTN, HLD and IFG. He is doing well with no issues.     His HTN is chronic and is on HCTZ. No CP or dizziness. Running good at home.     His HLD is chronic and doing well on Zocor. He tolerates well with no muscle cramps.     Seeing [facility] for elevated PSA and no cancer.     He has been eating more sweets lately. BG's are running higher. He has IFG that is chronic. Still exercising 3 days per week.          The following portions of the patient's history were reviewed and updated as appropriate: allergies, current medications, past family history, past medical history, past social history, past surgical history and problem list.    Allergies: Review of patient's allergies indicates no known allergies.    Current Outpatient Prescriptions:   •  B-Complex tablet, Take 1 tablet by mouth daily., Disp: , Rfl:   •  hydrochlorothiazide (HYDRODIURIL) 12.5 MG tablet, Take 1 tablet by mouth Daily., Disp: 90 tablet, Rfl: 3  •  Multiple Vitamin (MULTI VITAMIN DAILY PO), Take 1 tablet by mouth daily., Disp: , Rfl:   •  Omega-3 Fatty Acids (FISH OIL) 1000 MG capsule capsule, Take 1,000 mg by mouth daily with breakfast., Disp: , Rfl:   •  Selenium 200 MCG capsule, Take 200 mcg by mouth., Disp: , Rfl:   •  simvastatin (ZOCOR) 20 MG tablet, Take 1 tablet by mouth Every Night., Disp: 90 tablet, Rfl: 3  There are no discontinued medications.    Review of Systems   Constitutional: Negative for fatigue and fever.   Respiratory: Negative for cough and shortness of breath.    Cardiovascular: Negative for chest pain and palpitations.   Gastrointestinal: Negative for constipation, diarrhea, nausea and vomiting.   Skin: Negative for rash.             /76 (BP Location: Left arm, Patient Position: Sitting, Cuff Size:  "Adult)   Pulse 89   Resp 16   Ht 177.8 cm (70\")   Wt 95.3 kg (210 lb)   SpO2 97%   BMI 30.13 kg/m²       Physical Exam   Constitutional: He is oriented to person, place, and time. He appears well-developed and well-nourished. No distress.   HENT:   Head: Normocephalic and atraumatic.   Right Ear: External ear normal.   Left Ear: External ear normal.   Mouth/Throat: Oropharynx is clear and moist. No oropharyngeal exudate.   Eyes: Conjunctivae are normal. Right eye exhibits no discharge. Left eye exhibits no discharge. No scleral icterus.   Neck: Neck supple.   Cardiovascular: Normal rate, regular rhythm and normal heart sounds.  Exam reveals no gallop and no friction rub.    No murmur heard.  Pulmonary/Chest: Effort normal and breath sounds normal. No respiratory distress. He has no wheezes. He has no rales.   Abdominal: Soft. Bowel sounds are normal. He exhibits no distension and no mass. There is no tenderness. There is no guarding.   Lymphadenopathy:     He has no cervical adenopathy.   Neurological: He is alert and oriented to person, place, and time.   Skin: Skin is warm. No rash noted.   Psychiatric: He has a normal mood and affect. His behavior is normal.   Nursing note and vitals reviewed.        Results for orders placed or performed in visit on 10/10/17   Hemoglobin A1c   Result Value Ref Range    Hemoglobin A1C 5.40 4.80 - 5.60 %   Lipid Panel With LDL / HDL Ratio   Result Value Ref Range    Total Cholesterol 198 0 - 200 mg/dL    Triglycerides 151 (H) 0 - 150 mg/dL    HDL Cholesterol 54 40 - 60 mg/dL    VLDL Cholesterol 30.2 5 - 40 mg/dL    LDL Cholesterol  114 (H) 0 - 100 mg/dL    LDL/HDL Ratio 2.11    Comprehensive Metabolic Panel   Result Value Ref Range    Glucose 118 (H) 65 - 99 mg/dL    BUN 24 (H) 8 - 23 mg/dL    Creatinine 1.25 0.76 - 1.27 mg/dL    eGFR Non African Am 56 (L) >60 mL/min/1.73    eGFR African Am 68 >60 mL/min/1.73    BUN/Creatinine Ratio 19.2 7.0 - 25.0    Sodium 141 136 - 145 " mmol/L    Potassium 4.7 3.5 - 5.2 mmol/L    Chloride 99 98 - 107 mmol/L    Total CO2 28.3 22.0 - 29.0 mmol/L    Calcium 9.9 8.6 - 10.5 mg/dL    Total Protein 7.0 6.0 - 8.5 g/dL    Albumin 4.30 3.50 - 5.20 g/dL    Globulin 2.7 gm/dL    A/G Ratio 1.6 g/dL    Total Bilirubin 0.7 0.1 - 1.2 mg/dL    Alkaline Phosphatase 82 39 - 117 U/L    AST (SGOT) 30 1 - 40 U/L    ALT (SGPT) 32 1 - 41 U/L   Microalbumin / Creatinine Urine Ratio - Urine, Clean Catch   Result Value Ref Range    Creatinine, Urine 138.3 Not Estab. mg/dL    Microalbumin, Urine 4.5 Not Estab. ug/mL    Microalbumin/Creatinine Ratio 3.3 0.0 - 30.0 mg/g creat   Urinalysis With / Culture If Indicated - Urine, Clean Catch   Result Value Ref Range    Specific Gravity, UA 1.025 1.005 - 1.030    pH, UA 5.0 5.0 - 7.5    Color, UA Yellow Yellow    Appearance, UA Clear Clear    Leukocytes, UA Negative Negative    Protein Negative Negative/Trace    Glucose, UA Negative Negative    Ketones Negative Negative    Blood, UA Negative Negative    Bilirubin, UA Negative Negative    Urobilinogen, UA 0.2 0.2 - 1.0 mg/dL    Nitrite, UA Negative Negative    Microscopic Examination Comment     MICROSCOPIC EXAMINATION See below:     Urinalysis Reflex Comment    Vitamin B12   Result Value Ref Range    Vitamin B-12 669 211 - 946 pg/mL   Microscopic Examination   Result Value Ref Range    WBC, UA 0-5 0 - 5 /hpf    RBC, UA 0-2 0 - 2 /hpf    Epithelial Cells (non renal) None seen 0 - 10 /hpf    Casts Present (A) None seen /lpf    Cast Type Hyaline casts N/A    Mucus, UA Present Not Estab. /hpf    Bacteria, UA None seen None seen/Few /hpf   CBC & Differential   Result Value Ref Range    WBC 9.00 4.50 - 10.70 10*3/mm3    RBC 4.85 4.60 - 6.00 10*6/mm3    Hemoglobin 14.7 13.7 - 17.6 g/dL    Hematocrit 48.1 40.4 - 52.2 %    MCV 99.2 (H) 79.8 - 96.2 fL    MCH 30.3 27.0 - 32.7 pg    MCHC 30.6 (L) 32.6 - 36.4 g/dL    RDW 14.0 11.5 - 14.5 %    Platelets 316 140 - 500 10*3/mm3    Neutrophil Rel %  59.9 42.7 - 76.0 %    Lymphocyte Rel % 28.2 19.6 - 45.3 %    Monocyte Rel % 7.9 5.0 - 12.0 %    Eosinophil Rel % 2.9 0.3 - 6.2 %    Basophil Rel % 0.9 0.0 - 1.5 %    Neutrophils Absolute 5.39 1.90 - 8.10 10*3/mm3    Lymphocytes Absolute 2.54 0.90 - 4.80 10*3/mm3    Monocytes Absolute 0.71 0.20 - 1.20 10*3/mm3    Eosinophils Absolute 0.26 0.00 - 0.70 10*3/mm3    Basophils Absolute 0.08 0.00 - 0.20 10*3/mm3    Immature Granulocyte Rel % 0.2 0.0 - 0.5 %    Immature Grans Absolute 0.02 0.00 - 0.03 10*3/mm3           Jung was seen today for follow-up and hypertension.    Diagnoses and all orders for this visit:    Essential hypertension    Mixed hyperlipidemia    Impaired fasting glucose    Patient's cholesterol is under good control. Will continue current regimen of Simvastatin. No side effects from medications reported. Will follow up in 6 months to monitor levels.     Patient's BG's have been elevated and their HgA1c is 5.4%. Will continue to focus on diet and exercise. Will follow up in 6 months.     HTN is under good control and patient will continue to monitor with home BP cuff. No side effects from medications. Will continue current regimen of HCTZ and consider switching to ACE in future. Will follow up in 6 months      Kidney function is slightly lower. Increase water and avoid NSAID's.     Return in about 6 months (around 10/10/2018) for Medicare Wellness, Recheck.    Jackie Yadav MD  04/03/2018

## 2018-08-26 ENCOUNTER — APPOINTMENT (OUTPATIENT)
Dept: GENERAL RADIOLOGY | Facility: HOSPITAL | Age: 73
End: 2018-08-26

## 2018-08-26 ENCOUNTER — APPOINTMENT (OUTPATIENT)
Dept: CT IMAGING | Facility: HOSPITAL | Age: 73
End: 2018-08-26

## 2018-08-26 ENCOUNTER — HOSPITAL ENCOUNTER (INPATIENT)
Facility: HOSPITAL | Age: 73
LOS: 4 days | Discharge: HOME | End: 2018-08-30
Attending: EMERGENCY MEDICINE | Admitting: INTERNAL MEDICINE

## 2018-08-26 DIAGNOSIS — R41.82 ALTERED MENTAL STATUS, UNSPECIFIED ALTERED MENTAL STATUS TYPE: Primary | ICD-10-CM

## 2018-08-26 DIAGNOSIS — J18.9 PNEUMONIA OF RIGHT LOWER LOBE DUE TO INFECTIOUS ORGANISM: ICD-10-CM

## 2018-08-26 DIAGNOSIS — K57.92 DIVERTICULITIS OF INTESTINE WITHOUT PERFORATION OR ABSCESS WITHOUT BLEEDING, UNSPECIFIED PART OF INTESTINAL TRACT: ICD-10-CM

## 2018-08-26 DIAGNOSIS — R10.9 ABDOMINAL PAIN, UNSPECIFIED ABDOMINAL LOCATION: ICD-10-CM

## 2018-08-26 DIAGNOSIS — K75.0 LIVER ABSCESS: ICD-10-CM

## 2018-08-26 DIAGNOSIS — D72.829 LEUKOCYTOSIS, UNSPECIFIED TYPE: ICD-10-CM

## 2018-08-26 PROBLEM — R79.89 ELEVATED LFTS: Status: ACTIVE | Noted: 2018-08-26

## 2018-08-26 PROBLEM — R41.0 CONFUSION: Status: ACTIVE | Noted: 2018-08-26

## 2018-08-26 PROBLEM — N18.30 CKD (CHRONIC KIDNEY DISEASE) STAGE 3, GFR 30-59 ML/MIN (HCC): Status: ACTIVE | Noted: 2018-08-26

## 2018-08-26 PROBLEM — R53.1 WEAKNESS: Status: ACTIVE | Noted: 2018-08-26

## 2018-08-26 LAB
ABO GROUP BLD: NORMAL
ALBUMIN SERPL-MCNC: 4.2 G/DL (ref 3.5–5.2)
ALBUMIN/GLOB SERPL: 1.4 G/DL
ALP SERPL-CCNC: 92 U/L (ref 39–117)
ALT SERPL W P-5'-P-CCNC: 53 U/L (ref 1–41)
AMPHET+METHAMPHET UR QL: NEGATIVE
ANION GAP SERPL CALCULATED.3IONS-SCNC: 11.9 MMOL/L
APTT PPP: 21.8 SECONDS (ref 22.7–35.4)
AST SERPL-CCNC: 59 U/L (ref 1–40)
BACTERIA UR QL AUTO: NORMAL /HPF
BARBITURATES UR QL SCN: NEGATIVE
BASOPHILS # BLD AUTO: 0.02 10*3/MM3 (ref 0–0.2)
BASOPHILS NFR BLD AUTO: 0.1 % (ref 0–1.5)
BENZODIAZ UR QL SCN: NEGATIVE
BILIRUB SERPL-MCNC: 0.6 MG/DL (ref 0.1–1.2)
BILIRUB UR QL STRIP: NEGATIVE
BLD GP AB SCN SERPL QL: NEGATIVE
BUN BLD-MCNC: 22 MG/DL (ref 8–23)
BUN/CREAT SERPL: 17.3 (ref 7–25)
CALCIUM SPEC-SCNC: 9.1 MG/DL (ref 8.6–10.5)
CANNABINOIDS SERPL QL: NEGATIVE
CHLORIDE SERPL-SCNC: 100 MMOL/L (ref 98–107)
CLARITY UR: CLEAR
CO2 SERPL-SCNC: 27.1 MMOL/L (ref 22–29)
COCAINE UR QL: NEGATIVE
COLOR UR: YELLOW
CREAT BLD-MCNC: 1.27 MG/DL (ref 0.76–1.27)
D-LACTATE SERPL-SCNC: 1.7 MMOL/L (ref 0.5–2)
DEPRECATED RDW RBC AUTO: 46.3 FL (ref 37–54)
EOSINOPHIL # BLD AUTO: 0.03 10*3/MM3 (ref 0–0.7)
EOSINOPHIL NFR BLD AUTO: 0.2 % (ref 0.3–6.2)
ERYTHROCYTE [DISTWIDTH] IN BLOOD BY AUTOMATED COUNT: 13.6 % (ref 11.5–14.5)
GFR SERPL CREATININE-BSD FRML MDRD: 55 ML/MIN/1.73
GLOBULIN UR ELPH-MCNC: 3 GM/DL
GLUCOSE BLD-MCNC: 123 MG/DL (ref 65–99)
GLUCOSE UR STRIP-MCNC: NEGATIVE MG/DL
HCT VFR BLD AUTO: 44.3 % (ref 40.4–52.2)
HGB BLD-MCNC: 14.8 G/DL (ref 13.7–17.6)
HGB UR QL STRIP.AUTO: NEGATIVE
HYALINE CASTS UR QL AUTO: NORMAL /LPF
IMM GRANULOCYTES # BLD: 0.04 10*3/MM3 (ref 0–0.03)
IMM GRANULOCYTES NFR BLD: 0.2 % (ref 0–0.5)
INR PPP: 1.04 (ref 0.9–1.1)
KETONES UR QL STRIP: NEGATIVE
LEUKOCYTE ESTERASE UR QL STRIP.AUTO: ABNORMAL
LIPASE SERPL-CCNC: 24 U/L (ref 13–60)
LYMPHOCYTES # BLD AUTO: 0.71 10*3/MM3 (ref 0.9–4.8)
LYMPHOCYTES NFR BLD AUTO: 4.1 % (ref 19.6–45.3)
MCH RBC QN AUTO: 31 PG (ref 27–32.7)
MCHC RBC AUTO-ENTMCNC: 33.4 G/DL (ref 32.6–36.4)
MCV RBC AUTO: 92.9 FL (ref 79.8–96.2)
METHADONE UR QL SCN: NEGATIVE
MONOCYTES # BLD AUTO: 1.16 10*3/MM3 (ref 0.2–1.2)
MONOCYTES NFR BLD AUTO: 6.6 % (ref 5–12)
NEUTROPHILS # BLD AUTO: 15.61 10*3/MM3 (ref 1.9–8.1)
NEUTROPHILS NFR BLD AUTO: 89 % (ref 42.7–76)
NITRITE UR QL STRIP: NEGATIVE
OPIATES UR QL: NEGATIVE
OXYCODONE UR QL SCN: NEGATIVE
PH UR STRIP.AUTO: 6 [PH] (ref 5–8)
PLATELET # BLD AUTO: 236 10*3/MM3 (ref 140–500)
PMV BLD AUTO: 10.8 FL (ref 6–12)
POTASSIUM BLD-SCNC: 3.7 MMOL/L (ref 3.5–5.2)
PROT SERPL-MCNC: 7.2 G/DL (ref 6–8.5)
PROT UR QL STRIP: NEGATIVE
PROTHROMBIN TIME: 13.4 SECONDS (ref 11.7–14.2)
RBC # BLD AUTO: 4.77 10*6/MM3 (ref 4.6–6)
RBC # UR: NORMAL /HPF
REF LAB TEST METHOD: NORMAL
RH BLD: NEGATIVE
SODIUM BLD-SCNC: 139 MMOL/L (ref 136–145)
SP GR UR STRIP: 1.02 (ref 1–1.03)
SQUAMOUS #/AREA URNS HPF: NORMAL /HPF
T&S EXPIRATION DATE: NORMAL
TROPONIN T SERPL-MCNC: <0.01 NG/ML (ref 0–0.03)
UROBILINOGEN UR QL STRIP: ABNORMAL
WBC NRBC COR # BLD: 17.53 10*3/MM3 (ref 4.5–10.7)
WBC UR QL AUTO: NORMAL /HPF

## 2018-08-26 PROCEDURE — 87185 SC STD ENZYME DETCJ PER NZM: CPT | Performed by: EMERGENCY MEDICINE

## 2018-08-26 PROCEDURE — 25010000002 PIPERACILLIN SOD-TAZOBACTAM PER 1 G: Performed by: EMERGENCY MEDICINE

## 2018-08-26 PROCEDURE — 25010000002 IOPAMIDOL 61 % SOLUTION: Performed by: EMERGENCY MEDICINE

## 2018-08-26 PROCEDURE — 86850 RBC ANTIBODY SCREEN: CPT | Performed by: EMERGENCY MEDICINE

## 2018-08-26 PROCEDURE — 70450 CT HEAD/BRAIN W/O DYE: CPT

## 2018-08-26 PROCEDURE — 86900 BLOOD TYPING SEROLOGIC ABO: CPT | Performed by: EMERGENCY MEDICINE

## 2018-08-26 PROCEDURE — 80307 DRUG TEST PRSMV CHEM ANLYZR: CPT | Performed by: EMERGENCY MEDICINE

## 2018-08-26 PROCEDURE — 71046 X-RAY EXAM CHEST 2 VIEWS: CPT

## 2018-08-26 PROCEDURE — 85025 COMPLETE CBC W/AUTO DIFF WBC: CPT | Performed by: EMERGENCY MEDICINE

## 2018-08-26 PROCEDURE — 83605 ASSAY OF LACTIC ACID: CPT | Performed by: EMERGENCY MEDICINE

## 2018-08-26 PROCEDURE — 80053 COMPREHEN METABOLIC PANEL: CPT | Performed by: EMERGENCY MEDICINE

## 2018-08-26 PROCEDURE — 93010 ELECTROCARDIOGRAM REPORT: CPT | Performed by: INTERNAL MEDICINE

## 2018-08-26 PROCEDURE — 81001 URINALYSIS AUTO W/SCOPE: CPT | Performed by: EMERGENCY MEDICINE

## 2018-08-26 PROCEDURE — 93005 ELECTROCARDIOGRAM TRACING: CPT | Performed by: EMERGENCY MEDICINE

## 2018-08-26 PROCEDURE — 87076 CULTURE ANAEROBE IDENT EACH: CPT | Performed by: EMERGENCY MEDICINE

## 2018-08-26 PROCEDURE — 86901 BLOOD TYPING SEROLOGIC RH(D): CPT | Performed by: EMERGENCY MEDICINE

## 2018-08-26 PROCEDURE — 83690 ASSAY OF LIPASE: CPT | Performed by: EMERGENCY MEDICINE

## 2018-08-26 PROCEDURE — 74177 CT ABD & PELVIS W/CONTRAST: CPT

## 2018-08-26 PROCEDURE — 84484 ASSAY OF TROPONIN QUANT: CPT | Performed by: EMERGENCY MEDICINE

## 2018-08-26 PROCEDURE — 87040 BLOOD CULTURE FOR BACTERIA: CPT | Performed by: EMERGENCY MEDICINE

## 2018-08-26 PROCEDURE — 85730 THROMBOPLASTIN TIME PARTIAL: CPT | Performed by: EMERGENCY MEDICINE

## 2018-08-26 PROCEDURE — 85610 PROTHROMBIN TIME: CPT | Performed by: EMERGENCY MEDICINE

## 2018-08-26 PROCEDURE — 99285 EMERGENCY DEPT VISIT HI MDM: CPT

## 2018-08-26 RX ORDER — CHOLECALCIFEROL (VITAMIN D3) 125 MCG
5 CAPSULE ORAL NIGHTLY PRN
COMMUNITY
End: 2019-05-13

## 2018-08-26 RX ORDER — NITROGLYCERIN 0.4 MG/1
0.4 TABLET SUBLINGUAL
Status: DISCONTINUED | OUTPATIENT
Start: 2018-08-26 | End: 2018-08-30 | Stop reason: HOSPADM

## 2018-08-26 RX ORDER — ONDANSETRON 2 MG/ML
4 INJECTION INTRAMUSCULAR; INTRAVENOUS EVERY 6 HOURS PRN
Status: DISCONTINUED | OUTPATIENT
Start: 2018-08-26 | End: 2018-08-30 | Stop reason: HOSPADM

## 2018-08-26 RX ORDER — SODIUM CHLORIDE 0.9 % (FLUSH) 0.9 %
10 SYRINGE (ML) INJECTION AS NEEDED
Status: DISCONTINUED | OUTPATIENT
Start: 2018-08-26 | End: 2018-08-30 | Stop reason: HOSPADM

## 2018-08-26 RX ORDER — ACETAMINOPHEN 325 MG/1
650 TABLET ORAL EVERY 4 HOURS PRN
Status: DISCONTINUED | OUTPATIENT
Start: 2018-08-26 | End: 2018-08-30 | Stop reason: HOSPADM

## 2018-08-26 RX ORDER — SODIUM CHLORIDE 0.9 % (FLUSH) 0.9 %
1-10 SYRINGE (ML) INJECTION AS NEEDED
Status: DISCONTINUED | OUTPATIENT
Start: 2018-08-26 | End: 2018-08-30 | Stop reason: HOSPADM

## 2018-08-26 RX ORDER — ONDANSETRON 4 MG/1
4 TABLET, FILM COATED ORAL EVERY 6 HOURS PRN
Status: DISCONTINUED | OUTPATIENT
Start: 2018-08-26 | End: 2018-08-30 | Stop reason: HOSPADM

## 2018-08-26 RX ORDER — ACETAMINOPHEN 500 MG
1000 TABLET ORAL ONCE
Status: COMPLETED | OUTPATIENT
Start: 2018-08-26 | End: 2018-08-26

## 2018-08-26 RX ORDER — ONDANSETRON 4 MG/1
4 TABLET, ORALLY DISINTEGRATING ORAL EVERY 6 HOURS PRN
Status: DISCONTINUED | OUTPATIENT
Start: 2018-08-26 | End: 2018-08-30 | Stop reason: HOSPADM

## 2018-08-26 RX ORDER — SODIUM CHLORIDE 9 MG/ML
75 INJECTION, SOLUTION INTRAVENOUS CONTINUOUS
Status: DISCONTINUED | OUTPATIENT
Start: 2018-08-26 | End: 2018-08-30 | Stop reason: HOSPADM

## 2018-08-26 RX ADMIN — TAZOBACTAM SODIUM AND PIPERACILLIN SODIUM 3.38 G: 375; 3 INJECTION, SOLUTION INTRAVENOUS at 19:21

## 2018-08-26 RX ADMIN — SODIUM CHLORIDE 125 ML/HR: 9 INJECTION, SOLUTION INTRAVENOUS at 18:10

## 2018-08-26 RX ADMIN — SODIUM CHLORIDE 500 ML: 9 INJECTION, SOLUTION INTRAVENOUS at 16:45

## 2018-08-26 RX ADMIN — ACETAMINOPHEN 1000 MG: 500 TABLET, FILM COATED ORAL at 20:16

## 2018-08-26 RX ADMIN — IOPAMIDOL 85 ML: 612 INJECTION, SOLUTION INTRAVENOUS at 18:31

## 2018-08-27 ENCOUNTER — TELEPHONE (OUTPATIENT)
Dept: INTERNAL MEDICINE | Facility: CLINIC | Age: 73
End: 2018-08-27

## 2018-08-27 ENCOUNTER — APPOINTMENT (OUTPATIENT)
Dept: MRI IMAGING | Facility: HOSPITAL | Age: 73
End: 2018-08-27

## 2018-08-27 PROBLEM — E87.6 HYPOKALEMIA: Status: ACTIVE | Noted: 2018-08-27

## 2018-08-27 PROBLEM — M89.9 SKULL LESION: Status: ACTIVE | Noted: 2018-08-27

## 2018-08-27 LAB
ANION GAP SERPL CALCULATED.3IONS-SCNC: 10.5 MMOL/L
BUN BLD-MCNC: 21 MG/DL (ref 8–23)
BUN/CREAT SERPL: 16.8 (ref 7–25)
CALCIUM SPEC-SCNC: 8.2 MG/DL (ref 8.6–10.5)
CHLORIDE SERPL-SCNC: 102 MMOL/L (ref 98–107)
CO2 SERPL-SCNC: 26.5 MMOL/L (ref 22–29)
CREAT BLD-MCNC: 1.25 MG/DL (ref 0.76–1.27)
DEPRECATED RDW RBC AUTO: 48.4 FL (ref 37–54)
ERYTHROCYTE [DISTWIDTH] IN BLOOD BY AUTOMATED COUNT: 14.3 % (ref 11.5–14.5)
GFR SERPL CREATININE-BSD FRML MDRD: 56 ML/MIN/1.73
GLUCOSE BLD-MCNC: 129 MG/DL (ref 65–99)
HCT VFR BLD AUTO: 39.7 % (ref 40.4–52.2)
HGB BLD-MCNC: 12.9 G/DL (ref 13.7–17.6)
MCH RBC QN AUTO: 30.6 PG (ref 27–32.7)
MCHC RBC AUTO-ENTMCNC: 32.5 G/DL (ref 32.6–36.4)
MCV RBC AUTO: 94.1 FL (ref 79.8–96.2)
PLATELET # BLD AUTO: 232 10*3/MM3 (ref 140–500)
PMV BLD AUTO: 11.2 FL (ref 6–12)
POTASSIUM BLD-SCNC: 3.2 MMOL/L (ref 3.5–5.2)
RBC # BLD AUTO: 4.22 10*6/MM3 (ref 4.6–6)
SODIUM BLD-SCNC: 139 MMOL/L (ref 136–145)
WBC NRBC COR # BLD: 18.06 10*3/MM3 (ref 4.5–10.7)

## 2018-08-27 PROCEDURE — 80048 BASIC METABOLIC PNL TOTAL CA: CPT | Performed by: NURSE PRACTITIONER

## 2018-08-27 PROCEDURE — A9577 INJ MULTIHANCE: HCPCS | Performed by: INTERNAL MEDICINE

## 2018-08-27 PROCEDURE — 25010000002 PIPERACILLIN SOD-TAZOBACTAM PER 1 G: Performed by: NURSE PRACTITIONER

## 2018-08-27 PROCEDURE — 85027 COMPLETE CBC AUTOMATED: CPT | Performed by: NURSE PRACTITIONER

## 2018-08-27 PROCEDURE — 70549 MR ANGIOGRAPH NECK W/O&W/DYE: CPT

## 2018-08-27 PROCEDURE — 36415 COLL VENOUS BLD VENIPUNCTURE: CPT | Performed by: NURSE PRACTITIONER

## 2018-08-27 PROCEDURE — 70553 MRI BRAIN STEM W/O & W/DYE: CPT

## 2018-08-27 PROCEDURE — 0 GADOBENATE DIMEGLUMINE 529 MG/ML SOLUTION: Performed by: INTERNAL MEDICINE

## 2018-08-27 RX ORDER — POTASSIUM CHLORIDE 7.45 MG/ML
10 INJECTION INTRAVENOUS
Status: DISCONTINUED | OUTPATIENT
Start: 2018-08-27 | End: 2018-08-30 | Stop reason: HOSPADM

## 2018-08-27 RX ORDER — POTASSIUM CHLORIDE 750 MG/1
40 CAPSULE, EXTENDED RELEASE ORAL AS NEEDED
Status: DISCONTINUED | OUTPATIENT
Start: 2018-08-27 | End: 2018-08-30 | Stop reason: HOSPADM

## 2018-08-27 RX ORDER — POTASSIUM CHLORIDE 1.5 G/1.77G
40 POWDER, FOR SOLUTION ORAL AS NEEDED
Status: DISCONTINUED | OUTPATIENT
Start: 2018-08-27 | End: 2018-08-30 | Stop reason: HOSPADM

## 2018-08-27 RX ORDER — HYDROCODONE BITARTRATE AND ACETAMINOPHEN 5; 325 MG/1; MG/1
1 TABLET ORAL EVERY 6 HOURS PRN
Status: DISCONTINUED | OUTPATIENT
Start: 2018-08-27 | End: 2018-08-30 | Stop reason: HOSPADM

## 2018-08-27 RX ADMIN — GADOBENATE DIMEGLUMINE 20 ML: 529 INJECTION, SOLUTION INTRAVENOUS at 08:53

## 2018-08-27 RX ADMIN — TAZOBACTAM SODIUM AND PIPERACILLIN SODIUM 3.38 G: 375; 3 INJECTION, SOLUTION INTRAVENOUS at 12:52

## 2018-08-27 RX ADMIN — SODIUM CHLORIDE 75 ML/HR: 9 INJECTION, SOLUTION INTRAVENOUS at 12:52

## 2018-08-27 RX ADMIN — TAZOBACTAM SODIUM AND PIPERACILLIN SODIUM 3.38 G: 375; 3 INJECTION, SOLUTION INTRAVENOUS at 04:02

## 2018-08-27 RX ADMIN — SODIUM CHLORIDE 75 ML/HR: 9 INJECTION, SOLUTION INTRAVENOUS at 23:49

## 2018-08-27 RX ADMIN — TAZOBACTAM SODIUM AND PIPERACILLIN SODIUM 3.38 G: 375; 3 INJECTION, SOLUTION INTRAVENOUS at 19:39

## 2018-08-27 RX ADMIN — ACETAMINOPHEN 650 MG: 325 TABLET, FILM COATED ORAL at 16:55

## 2018-08-27 NOTE — TELEPHONE ENCOUNTER
----- Message from Jackie Oliveira MD sent at 8/27/2018  4:35 PM EDT -----  Regarding: RE: VIVEK MAC  Talked to wife regarding admission. No concerns and doing better.   ----- Message -----  From: Nati Enriquez MA  Sent: 8/27/2018   3:37 PM  To: Jackie Oliveira MD  Subject: FW: VIVEK MAC                                   ----- Message -----  From: Prabha Kearney  Sent: 8/27/2018   2:55 PM  To: [facility]  Subject: LIZ OLIVEIRA    Patient's wife phoned to speak with PCP.  He was admitted to the hospital at [facility] on August 26, 2018.    Mrs. Blount asked if she could speak with Dr. Oliveira.  [phone]

## 2018-08-28 ENCOUNTER — APPOINTMENT (OUTPATIENT)
Dept: CT IMAGING | Facility: HOSPITAL | Age: 73
End: 2018-08-28

## 2018-08-28 PROBLEM — K59.00 CONSTIPATION: Status: ACTIVE | Noted: 2018-08-28

## 2018-08-28 PROBLEM — R16.0 LIVER MASSES: Status: ACTIVE | Noted: 2018-08-28

## 2018-08-28 LAB
ALBUMIN SERPL-MCNC: 3.1 G/DL (ref 3.5–5.2)
ALBUMIN/GLOB SERPL: 1.1 G/DL
ALP SERPL-CCNC: 95 U/L (ref 39–117)
ALT SERPL W P-5'-P-CCNC: 80 U/L (ref 1–41)
ANION GAP SERPL CALCULATED.3IONS-SCNC: 11 MMOL/L
AST SERPL-CCNC: 63 U/L (ref 1–40)
BASOPHILS # BLD AUTO: 0.03 10*3/MM3 (ref 0–0.2)
BASOPHILS NFR BLD AUTO: 0.2 % (ref 0–1.5)
BILIRUB SERPL-MCNC: 0.8 MG/DL (ref 0.1–1.2)
BUN BLD-MCNC: 15 MG/DL (ref 8–23)
BUN/CREAT SERPL: 15.5 (ref 7–25)
CALCIUM SPEC-SCNC: 8.3 MG/DL (ref 8.6–10.5)
CHLORIDE SERPL-SCNC: 105 MMOL/L (ref 98–107)
CO2 SERPL-SCNC: 23 MMOL/L (ref 22–29)
CREAT BLD-MCNC: 0.97 MG/DL (ref 0.76–1.27)
DEPRECATED RDW RBC AUTO: 48.1 FL (ref 37–54)
EOSINOPHIL # BLD AUTO: 0.07 10*3/MM3 (ref 0–0.7)
EOSINOPHIL NFR BLD AUTO: 0.5 % (ref 0.3–6.2)
ERYTHROCYTE [DISTWIDTH] IN BLOOD BY AUTOMATED COUNT: 14 % (ref 11.5–14.5)
GFR SERPL CREATININE-BSD FRML MDRD: 75 ML/MIN/1.73
GLOBULIN UR ELPH-MCNC: 2.8 GM/DL
GLUCOSE BLD-MCNC: 126 MG/DL (ref 65–99)
HCT VFR BLD AUTO: 42.1 % (ref 40.4–52.2)
HGB BLD-MCNC: 13.2 G/DL (ref 13.7–17.6)
IMM GRANULOCYTES # BLD: 0.05 10*3/MM3 (ref 0–0.03)
IMM GRANULOCYTES NFR BLD: 0.4 % (ref 0–0.5)
LYMPHOCYTES # BLD AUTO: 1.25 10*3/MM3 (ref 0.9–4.8)
LYMPHOCYTES NFR BLD AUTO: 8.9 % (ref 19.6–45.3)
MCH RBC QN AUTO: 29.7 PG (ref 27–32.7)
MCHC RBC AUTO-ENTMCNC: 31.4 G/DL (ref 32.6–36.4)
MCV RBC AUTO: 94.8 FL (ref 79.8–96.2)
MONOCYTES # BLD AUTO: 1.39 10*3/MM3 (ref 0.2–1.2)
MONOCYTES NFR BLD AUTO: 9.9 % (ref 5–12)
NEUTROPHILS # BLD AUTO: 11.2 10*3/MM3 (ref 1.9–8.1)
NEUTROPHILS NFR BLD AUTO: 80.1 % (ref 42.7–76)
PLATELET # BLD AUTO: 211 10*3/MM3 (ref 140–500)
PMV BLD AUTO: 10.8 FL (ref 6–12)
POTASSIUM BLD-SCNC: 3.2 MMOL/L (ref 3.5–5.2)
POTASSIUM BLD-SCNC: 3.9 MMOL/L (ref 3.5–5.2)
PROT SERPL-MCNC: 5.9 G/DL (ref 6–8.5)
RBC # BLD AUTO: 4.44 10*6/MM3 (ref 4.6–6)
SODIUM BLD-SCNC: 139 MMOL/L (ref 136–145)
WBC NRBC COR # BLD: 13.99 10*3/MM3 (ref 4.5–10.7)

## 2018-08-28 PROCEDURE — 87205 SMEAR GRAM STAIN: CPT | Performed by: INTERNAL MEDICINE

## 2018-08-28 PROCEDURE — 77012 CT SCAN FOR NEEDLE BIOPSY: CPT

## 2018-08-28 PROCEDURE — 87176 TISSUE HOMOGENIZATION CULTR: CPT | Performed by: INTERNAL MEDICINE

## 2018-08-28 PROCEDURE — 88307 TISSUE EXAM BY PATHOLOGIST: CPT | Performed by: INTERNAL MEDICINE

## 2018-08-28 PROCEDURE — 87102 FUNGUS ISOLATION CULTURE: CPT | Performed by: INTERNAL MEDICINE

## 2018-08-28 PROCEDURE — 84132 ASSAY OF SERUM POTASSIUM: CPT | Performed by: INTERNAL MEDICINE

## 2018-08-28 PROCEDURE — 87206 SMEAR FLUORESCENT/ACID STAI: CPT | Performed by: INTERNAL MEDICINE

## 2018-08-28 PROCEDURE — 80053 COMPREHEN METABOLIC PANEL: CPT | Performed by: INTERNAL MEDICINE

## 2018-08-28 PROCEDURE — 74170 CT ABD WO CNTRST FLWD CNTRST: CPT

## 2018-08-28 PROCEDURE — 25010000002 IOPAMIDOL 61 % SOLUTION: Performed by: INTERNAL MEDICINE

## 2018-08-28 PROCEDURE — 87070 CULTURE OTHR SPECIMN AEROBIC: CPT | Performed by: INTERNAL MEDICINE

## 2018-08-28 PROCEDURE — 88313 SPECIAL STAINS GROUP 2: CPT | Performed by: INTERNAL MEDICINE

## 2018-08-28 PROCEDURE — 0FB13ZX EXCISION OF RIGHT LOBE LIVER, PERCUTANEOUS APPROACH, DIAGNOSTIC: ICD-10-PCS | Performed by: RADIOLOGY

## 2018-08-28 PROCEDURE — 85025 COMPLETE CBC W/AUTO DIFF WBC: CPT | Performed by: INTERNAL MEDICINE

## 2018-08-28 PROCEDURE — 87116 MYCOBACTERIA CULTURE: CPT | Performed by: INTERNAL MEDICINE

## 2018-08-28 PROCEDURE — 25010000002 PIPERACILLIN SOD-TAZOBACTAM PER 1 G: Performed by: NURSE PRACTITIONER

## 2018-08-28 PROCEDURE — 87075 CULTR BACTERIA EXCEPT BLOOD: CPT | Performed by: INTERNAL MEDICINE

## 2018-08-28 RX ORDER — LIDOCAINE HYDROCHLORIDE 10 MG/ML
20 INJECTION, SOLUTION INFILTRATION; PERINEURAL ONCE
Status: COMPLETED | OUTPATIENT
Start: 2018-08-28 | End: 2018-08-28

## 2018-08-28 RX ORDER — SENNA AND DOCUSATE SODIUM 50; 8.6 MG/1; MG/1
2 TABLET, FILM COATED ORAL 2 TIMES DAILY
Status: DISCONTINUED | OUTPATIENT
Start: 2018-08-28 | End: 2018-08-30 | Stop reason: HOSPADM

## 2018-08-28 RX ORDER — SODIUM CHLORIDE 0.9 % (FLUSH) 0.9 %
1-10 SYRINGE (ML) INJECTION AS NEEDED
Status: DISCONTINUED | OUTPATIENT
Start: 2018-08-28 | End: 2018-08-30 | Stop reason: HOSPADM

## 2018-08-28 RX ADMIN — TAZOBACTAM SODIUM AND PIPERACILLIN SODIUM 3.38 G: 375; 3 INJECTION, SOLUTION INTRAVENOUS at 03:04

## 2018-08-28 RX ADMIN — POTASSIUM CHLORIDE 40 MEQ: 750 CAPSULE, EXTENDED RELEASE ORAL at 16:56

## 2018-08-28 RX ADMIN — SODIUM CHLORIDE 75 ML/HR: 9 INJECTION, SOLUTION INTRAVENOUS at 16:26

## 2018-08-28 RX ADMIN — DOCUSATE SODIUM -SENNOSIDES 2 TABLET: 50; 8.6 TABLET, COATED ORAL at 20:32

## 2018-08-28 RX ADMIN — TAZOBACTAM SODIUM AND PIPERACILLIN SODIUM 3.38 G: 375; 3 INJECTION, SOLUTION INTRAVENOUS at 20:32

## 2018-08-28 RX ADMIN — IOPAMIDOL 90 ML: 612 INJECTION, SOLUTION INTRAVENOUS at 10:15

## 2018-08-28 RX ADMIN — LIDOCAINE HYDROCHLORIDE 20 ML: 10 INJECTION, SOLUTION INFILTRATION; PERINEURAL at 15:35

## 2018-08-28 RX ADMIN — POLYETHYLENE GLYCOL 3350 17 G: 17 POWDER, FOR SOLUTION ORAL at 16:25

## 2018-08-28 RX ADMIN — POTASSIUM CHLORIDE 40 MEQ: 750 CAPSULE, EXTENDED RELEASE ORAL at 10:46

## 2018-08-28 RX ADMIN — TAZOBACTAM SODIUM AND PIPERACILLIN SODIUM 3.38 G: 375; 3 INJECTION, SOLUTION INTRAVENOUS at 12:16

## 2018-08-29 LAB
ANION GAP SERPL CALCULATED.3IONS-SCNC: 11.8 MMOL/L
BUN BLD-MCNC: 13 MG/DL (ref 8–23)
BUN/CREAT SERPL: 13.4 (ref 7–25)
CALCIUM SPEC-SCNC: 8.7 MG/DL (ref 8.6–10.5)
CHLORIDE SERPL-SCNC: 105 MMOL/L (ref 98–107)
CO2 SERPL-SCNC: 23.2 MMOL/L (ref 22–29)
CREAT BLD-MCNC: 0.97 MG/DL (ref 0.76–1.27)
DEPRECATED RDW RBC AUTO: 48.5 FL (ref 37–54)
ERYTHROCYTE [DISTWIDTH] IN BLOOD BY AUTOMATED COUNT: 14.1 % (ref 11.5–14.5)
GFR SERPL CREATININE-BSD FRML MDRD: 75 ML/MIN/1.73
GLUCOSE BLD-MCNC: 153 MG/DL (ref 65–99)
HCT VFR BLD AUTO: 40.9 % (ref 40.4–52.2)
HGB BLD-MCNC: 13.2 G/DL (ref 13.7–17.6)
MCH RBC QN AUTO: 30.3 PG (ref 27–32.7)
MCHC RBC AUTO-ENTMCNC: 32.3 G/DL (ref 32.6–36.4)
MCV RBC AUTO: 93.8 FL (ref 79.8–96.2)
PLATELET # BLD AUTO: 236 10*3/MM3 (ref 140–500)
PMV BLD AUTO: 11.1 FL (ref 6–12)
POTASSIUM BLD-SCNC: 4 MMOL/L (ref 3.5–5.2)
RBC # BLD AUTO: 4.36 10*6/MM3 (ref 4.6–6)
SODIUM BLD-SCNC: 140 MMOL/L (ref 136–145)
WBC NRBC COR # BLD: 9.79 10*3/MM3 (ref 4.5–10.7)

## 2018-08-29 PROCEDURE — 25010000002 PIPERACILLIN SOD-TAZOBACTAM PER 1 G: Performed by: NURSE PRACTITIONER

## 2018-08-29 PROCEDURE — 80048 BASIC METABOLIC PNL TOTAL CA: CPT | Performed by: INTERNAL MEDICINE

## 2018-08-29 PROCEDURE — 85027 COMPLETE CBC AUTOMATED: CPT | Performed by: INTERNAL MEDICINE

## 2018-08-29 RX ADMIN — TAZOBACTAM SODIUM AND PIPERACILLIN SODIUM 3.38 G: 375; 3 INJECTION, SOLUTION INTRAVENOUS at 03:58

## 2018-08-29 RX ADMIN — SODIUM CHLORIDE 75 ML/HR: 9 INJECTION, SOLUTION INTRAVENOUS at 03:58

## 2018-08-29 RX ADMIN — SODIUM CHLORIDE 75 ML/HR: 9 INJECTION, SOLUTION INTRAVENOUS at 09:03

## 2018-08-29 RX ADMIN — POLYETHYLENE GLYCOL 3350 17 G: 17 POWDER, FOR SOLUTION ORAL at 09:02

## 2018-08-29 RX ADMIN — SODIUM CHLORIDE 75 ML/HR: 9 INJECTION, SOLUTION INTRAVENOUS at 23:15

## 2018-08-29 RX ADMIN — TAZOBACTAM SODIUM AND PIPERACILLIN SODIUM 3.38 G: 375; 3 INJECTION, SOLUTION INTRAVENOUS at 12:31

## 2018-08-29 RX ADMIN — TAZOBACTAM SODIUM AND PIPERACILLIN SODIUM 3.38 G: 375; 3 INJECTION, SOLUTION INTRAVENOUS at 19:46

## 2018-08-29 RX ADMIN — ACETAMINOPHEN 650 MG: 325 TABLET, FILM COATED ORAL at 03:58

## 2018-08-30 VITALS
HEART RATE: 84 BPM | SYSTOLIC BLOOD PRESSURE: 133 MMHG | HEIGHT: 70 IN | RESPIRATION RATE: 18 BRPM | WEIGHT: 207.3 LBS | DIASTOLIC BLOOD PRESSURE: 91 MMHG | BODY MASS INDEX: 29.68 KG/M2 | OXYGEN SATURATION: 94 % | TEMPERATURE: 98.2 F

## 2018-08-30 PROBLEM — D72.829 LEUKOCYTOSIS: Status: RESOLVED | Noted: 2018-08-26 | Resolved: 2018-08-30

## 2018-08-30 PROBLEM — K75.0 LIVER ABSCESS: Status: ACTIVE | Noted: 2018-08-28

## 2018-08-30 PROBLEM — E87.6 HYPOKALEMIA: Status: RESOLVED | Noted: 2018-08-27 | Resolved: 2018-08-30

## 2018-08-30 PROBLEM — K59.00 CONSTIPATION: Status: RESOLVED | Noted: 2018-08-28 | Resolved: 2018-08-30

## 2018-08-30 LAB
CYTO UR: NORMAL
DEPRECATED RDW RBC AUTO: 47.5 FL (ref 37–54)
ERYTHROCYTE [DISTWIDTH] IN BLOOD BY AUTOMATED COUNT: 13.8 % (ref 11.5–14.5)
HCT VFR BLD AUTO: 38.3 % (ref 40.4–52.2)
HGB BLD-MCNC: 12 G/DL (ref 13.7–17.6)
LAB AP CASE REPORT: NORMAL
LAB AP SPECIAL STAINS: NORMAL
MCH RBC QN AUTO: 29.7 PG (ref 27–32.7)
MCHC RBC AUTO-ENTMCNC: 31.3 G/DL (ref 32.6–36.4)
MCV RBC AUTO: 94.8 FL (ref 79.8–96.2)
PATH REPORT.FINAL DX SPEC: NORMAL
PATH REPORT.GROSS SPEC: NORMAL
PLATELET # BLD AUTO: 260 10*3/MM3 (ref 140–500)
PMV BLD AUTO: 10.9 FL (ref 6–12)
RBC # BLD AUTO: 4.04 10*6/MM3 (ref 4.6–6)
WBC NRBC COR # BLD: 8.67 10*3/MM3 (ref 4.5–10.7)

## 2018-08-30 PROCEDURE — 85027 COMPLETE CBC AUTOMATED: CPT | Performed by: INTERNAL MEDICINE

## 2018-08-30 PROCEDURE — 25010000002 PIPERACILLIN SOD-TAZOBACTAM PER 1 G: Performed by: NURSE PRACTITIONER

## 2018-08-30 RX ORDER — METRONIDAZOLE 500 MG/1
500 TABLET ORAL 3 TIMES DAILY
Qty: 84 TABLET | Refills: 1 | Status: SHIPPED | OUTPATIENT
Start: 2018-08-30 | End: 2018-09-27

## 2018-08-30 RX ORDER — LEVOFLOXACIN 750 MG/1
750 TABLET ORAL DAILY
Qty: 28 TABLET | Refills: 1 | Status: SHIPPED | OUTPATIENT
Start: 2018-08-30 | End: 2018-09-27

## 2018-08-30 RX ADMIN — TAZOBACTAM SODIUM AND PIPERACILLIN SODIUM 3.38 G: 375; 3 INJECTION, SOLUTION INTRAVENOUS at 03:16

## 2018-08-30 RX ADMIN — DOCUSATE SODIUM -SENNOSIDES 2 TABLET: 50; 8.6 TABLET, COATED ORAL at 08:02

## 2018-08-30 RX ADMIN — ACETAMINOPHEN 650 MG: 325 TABLET, FILM COATED ORAL at 03:20

## 2018-08-30 RX ADMIN — TAZOBACTAM SODIUM AND PIPERACILLIN SODIUM 3.38 G: 375; 3 INJECTION, SOLUTION INTRAVENOUS at 12:03

## 2018-08-30 RX ADMIN — ACETAMINOPHEN 650 MG: 325 TABLET, FILM COATED ORAL at 14:57

## 2018-08-30 RX ADMIN — SODIUM CHLORIDE 75 ML/HR: 9 INJECTION, SOLUTION INTRAVENOUS at 12:49

## 2018-08-31 ENCOUNTER — READMISSION MANAGEMENT (OUTPATIENT)
Dept: CALL CENTER | Facility: HOSPITAL | Age: 73
End: 2018-08-31

## 2018-08-31 ENCOUNTER — TRANSITIONAL CARE MANAGEMENT TELEPHONE ENCOUNTER (OUTPATIENT)
Dept: INTERNAL MEDICINE | Facility: CLINIC | Age: 73
End: 2018-08-31

## 2018-08-31 LAB
BACTERIA SPEC AEROBE CULT: NORMAL
BACTERIA SPEC AEROBE CULT: NORMAL
GRAM STN SPEC: NORMAL

## 2018-09-02 LAB — BACTERIA SPEC ANAEROBE CULT: NORMAL

## 2018-09-04 ENCOUNTER — READMISSION MANAGEMENT (OUTPATIENT)
Dept: CALL CENTER | Facility: HOSPITAL | Age: 73
End: 2018-09-04

## 2018-09-04 ENCOUNTER — OFFICE VISIT (OUTPATIENT)
Dept: INTERNAL MEDICINE | Facility: CLINIC | Age: 73
End: 2018-09-04

## 2018-09-04 ENCOUNTER — TELEPHONE (OUTPATIENT)
Dept: INTERNAL MEDICINE | Facility: CLINIC | Age: 73
End: 2018-09-04

## 2018-09-04 VITALS
RESPIRATION RATE: 16 BRPM | HEIGHT: 70 IN | TEMPERATURE: 97.8 F | SYSTOLIC BLOOD PRESSURE: 142 MMHG | HEART RATE: 68 BPM | OXYGEN SATURATION: 97 % | WEIGHT: 198.6 LBS | DIASTOLIC BLOOD PRESSURE: 76 MMHG | BODY MASS INDEX: 28.43 KG/M2

## 2018-09-04 DIAGNOSIS — R05.3 CHRONIC COUGH: ICD-10-CM

## 2018-09-04 DIAGNOSIS — R41.82 ALTERED MENTAL STATUS, UNSPECIFIED ALTERED MENTAL STATUS TYPE: ICD-10-CM

## 2018-09-04 DIAGNOSIS — Z09 HOSPITAL DISCHARGE FOLLOW-UP: Primary | ICD-10-CM

## 2018-09-04 DIAGNOSIS — K75.0 LIVER ABSCESS: ICD-10-CM

## 2018-09-04 DIAGNOSIS — N18.30 CKD (CHRONIC KIDNEY DISEASE) STAGE 3, GFR 30-59 ML/MIN (HCC): ICD-10-CM

## 2018-09-04 DIAGNOSIS — R79.89 ELEVATED LFTS: ICD-10-CM

## 2018-09-04 DIAGNOSIS — I10 ESSENTIAL HYPERTENSION: ICD-10-CM

## 2018-09-04 DIAGNOSIS — K57.30 DIVERTICULA OF COLON: ICD-10-CM

## 2018-09-04 DIAGNOSIS — K75.0 LIVER ABSCESS: Primary | ICD-10-CM

## 2018-09-04 DIAGNOSIS — E87.6 HYPOKALEMIA: ICD-10-CM

## 2018-09-04 LAB
ALBUMIN SERPL-MCNC: 3.9 G/DL (ref 3.5–5.2)
ALBUMIN/GLOB SERPL: 1.4 G/DL
ALP SERPL-CCNC: 223 U/L (ref 40–129)
ALT SERPL-CCNC: 93 U/L (ref 5–41)
AST SERPL-CCNC: 67 U/L (ref 5–40)
BACTERIA SPEC AEROBE CULT: ABNORMAL
BASOPHILS # BLD AUTO: 0.06 10*3/MM3 (ref 0–0.2)
BASOPHILS NFR BLD AUTO: 0.6 % (ref 0–2)
BILIRUB SERPL-MCNC: 0.5 MG/DL (ref 0.2–1.2)
BUN SERPL-MCNC: 15 MG/DL (ref 8–23)
BUN/CREAT SERPL: 15.3 (ref 7–25)
CALCIUM SERPL-MCNC: 9.1 MG/DL (ref 8.8–10.5)
CHLORIDE SERPL-SCNC: 100 MMOL/L (ref 98–107)
CO2 SERPL-SCNC: 25.7 MMOL/L (ref 22–29)
CREAT SERPL-MCNC: 0.98 MG/DL (ref 0.76–1.27)
EOSINOPHIL # BLD AUTO: 0.16 10*3/MM3 (ref 0.1–0.3)
EOSINOPHIL NFR BLD AUTO: 1.7 % (ref 0–4)
ERYTHROCYTE [DISTWIDTH] IN BLOOD BY AUTOMATED COUNT: 13.3 % (ref 11.5–14.5)
GLOBULIN SER CALC-MCNC: 2.8 GM/DL
GLUCOSE SERPL-MCNC: 87 MG/DL (ref 65–99)
GRAM STN SPEC: ABNORMAL
HCT VFR BLD AUTO: 41.9 % (ref 42–52)
HGB BLD-MCNC: 13.7 G/DL (ref 14–18)
IMM GRANULOCYTES # BLD: 0.09 10*3/MM3 (ref 0–0.03)
IMM GRANULOCYTES NFR BLD: 0.9 % (ref 0–0.5)
ISOLATED FROM: ABNORMAL
LYMPHOCYTES # BLD AUTO: 1.83 10*3/MM3 (ref 0.6–4.8)
LYMPHOCYTES NFR BLD AUTO: 19.1 % (ref 20–45)
MCH RBC QN AUTO: 30.9 PG (ref 27–31)
MCHC RBC AUTO-ENTMCNC: 32.7 G/DL (ref 31–37)
MCV RBC AUTO: 94.4 FL (ref 80–94)
MONOCYTES # BLD AUTO: 0.79 10*3/MM3 (ref 0–1)
MONOCYTES NFR BLD AUTO: 8.2 % (ref 3–8)
NEUTROPHILS # BLD AUTO: 6.66 10*3/MM3 (ref 1.5–8.3)
NEUTROPHILS NFR BLD AUTO: 69.5 % (ref 45–70)
NRBC BLD AUTO-RTO: 0 /100 WBC (ref 0–0)
PLATELET # BLD AUTO: 527 10*3/MM3 (ref 140–500)
POTASSIUM SERPL-SCNC: 5.1 MMOL/L (ref 3.5–5.2)
PROT SERPL-MCNC: 6.7 G/DL (ref 6–8.5)
RBC # BLD AUTO: 4.44 10*6/MM3 (ref 4.7–6.1)
SODIUM SERPL-SCNC: 139 MMOL/L (ref 136–145)
WBC # BLD AUTO: 9.59 10*3/MM3 (ref 4.8–10.8)

## 2018-09-04 PROCEDURE — 99496 TRANSJ CARE MGMT HIGH F2F 7D: CPT | Performed by: INTERNAL MEDICINE

## 2018-09-04 RX ORDER — CETIRIZINE HYDROCHLORIDE 10 MG/1
10 TABLET ORAL DAILY
COMMUNITY
End: 2019-05-13

## 2018-09-04 NOTE — OUTREACH NOTE
COPD/PN Week 1 Survey      Responses   Facility patient discharged from?  [place]   Does the patient have one of the following disease processes/diagnoses(primary or secondary)?  COPD/Pneumonia   Is there a successful TCM telephone encounter documented?  Yes          Suzette Romero RN

## 2018-09-04 NOTE — PROGRESS NOTES
Transitional Care Follow Up Visit  Subjective     Jung Blount is a 73 y.o. male who presents for a transitional care management visit.    Within 48 business hours after discharge our office contacted him via telephone to coordinate his care and needs.      I reviewed and discussed the details of that call along with the discharge summary, hospital problems, inpatient lab results, inpatient diagnostic studies, and consultation reports with Jung.     Current outpatient and discharge medications have been reconciled for the patient.  Reviewed by: Jackie Yadav MD      Date of TCM Phone Call 8/31/2018   [facility]   Date of Admission 8/26/2018   Date of Discharge 8/30/2018   Discharge Disposition Home or Self Care     Risk for Readmission (LACE) Score: 11 (8/30/2018  6:00 AM)    74 yo M here for follow up from hospitalization for AMS, leukocytosis, elevated LFT's and liver abscess with possible diverticulitis. He presented to ER on 8/26/18 with confusion, chills for the last 3 weeks, and dehydration and found to eventually have lesions in his liver of unknown cause. I reviewed records including imaging, labs and pathology with patient and wife today. Patient is here with his wife today and doing well. He had two episodes of RUQ pain that lasted 30 minutes and went away with rest and heat since discharge. [place] as though he couldn't take deep breath. No leg swelling or CP though.    He has been off of statin and HCTZ since leaving the hospital and BP is running around 130-140/70-80 at home.        Course During Hospital Stay: Leukocytosis on admission. CT scan of the abdomen and pelvis showed a very mild, questionable diverticulitis in the sigmoid area.  He was started on IV zosyn with steady improvement.  Also demonstrated on the CT scan were 2 enhancing masses on the right hepatic lobe.  These were biopsied by IR on 8/28/18, and the pathology from these showed inflammation consistent with an abscess,  but cultures are negative tod date and had been on antibiotics for close to 48 hours at that time.  Dr. Nielsen with infectious disease evaluated the patient and he recommended 4 weeks of oral levaquin and flagyl and repeat triple phase contrasted abdominal CT scan in 2 weeks.  He will have follow up with Dr. Nielsen on 9/19/18 at 10:30AM.      MRI of the brain was done on admission due to his altered mental status which turned out to be normal aside from a smalll enhancing lesion in the occipital alesia on the right.  This was an indeterminate lesion and likely benign as there were no other similar lesions noted on his other imaging studies.         The following portions of the patient's history were reviewed and updated as appropriate: allergies, current medications, past family history, past medical history, past social history, past surgical history and problem list.    Review of Systems   Constitutional: Negative for chills, fatigue and fever.   HENT: Negative for congestion.    Respiratory: Positive for chest tightness (RUQ tightness that lasted 30 minutes that resolved with time ). Negative for cough and shortness of breath.    Cardiovascular: Negative for chest pain and palpitations.   Gastrointestinal: Negative for abdominal pain, constipation, diarrhea, nausea and vomiting.   Endocrine: Negative for cold intolerance and heat intolerance.   Genitourinary: Negative for difficulty urinating and dysuria.   Skin: Negative for rash.   Neurological: Negative for dizziness and headaches.       Objective   Physical Exam   Constitutional: He is oriented to person, place, and time. He appears well-developed and well-nourished. No distress.   HENT:   Head: Normocephalic and atraumatic.   Right Ear: External ear normal.   Left Ear: External ear normal.   Mouth/Throat: Oropharynx is clear and moist. No oropharyngeal exudate.   Eyes: Conjunctivae are normal. Right eye exhibits no discharge. Left eye exhibits no discharge. No  scleral icterus.   Neck: Neck supple.   Cardiovascular: Normal rate, regular rhythm and normal heart sounds.  Exam reveals no gallop and no friction rub.    No murmur heard.  Pulmonary/Chest: Effort normal and breath sounds normal. No respiratory distress. He has no wheezes. He has no rales.   Abdominal: Soft. Normal appearance and bowel sounds are normal. He exhibits no distension and no mass. There is no tenderness. There is no guarding.   Right incision from IR procedure stable and healing well    Lymphadenopathy:     He has no cervical adenopathy.   Neurological: He is alert and oriented to person, place, and time.   Skin: Skin is warm. Capillary refill takes less than 2 seconds. No rash noted.   Psychiatric: He has a normal mood and affect. His behavior is normal.   Nursing note and vitals reviewed.      Assessment/Plan   Jung was seen today for follow-up and transitional care management.    Diagnoses and all orders for this visit:    Hospital discharge follow-up    Altered mental status, unspecified altered mental status type    Liver abscess  -     CBC & Differential  -     Comprehensive Metabolic Panel    Diverticula of colon    Elevated LFTs  -     Comprehensive Metabolic Panel    Hypokalemia  -     Comprehensive Metabolic Panel    Essential hypertension  -     CBC & Differential    Chronic cough  -     CBC & Differential    CKD (chronic kidney disease) stage 3, GFR 30-59 ml/min      Patient is doing well. He will have f/u with Dr. Nielsen on 9/19 and we will schedule repeat triple phase CT one week before. Check CMP and CBC today to ensure that LFT's are improving and kidney function is stable for him to have another CT scan with contrast (plus or minus fluids based on blood work today). Will continue to hold HCTZ and statin for now.     I do think that after talking to patient and wife, that is possible that he has liver abscesses from hematogenous spread from scratches or bug bites on his legs. He was  not having belly pain, fever or diarrhea at the time of admission (as the H&P states) and had been experiencing chills for close to 3 weeks (possible blood stream infection??). Discussed with wife that we may never know the course of events, but I am happy that he is doing better now and seems to be feeling well.     He has had some PND with chronic cough, Reviewed CXR and I think that starting Zyrtec will help with cough. Will start today.     Needs c-scope once he is cleared from infection standpoint to complete all of his cancer screenings. Needs repeat MRI of his brain before end of year to also f/u on lytic lesion that is likely benign in his occipital skull.

## 2018-09-05 ENCOUNTER — TELEPHONE (OUTPATIENT)
Dept: INTERNAL MEDICINE | Facility: CLINIC | Age: 73
End: 2018-09-05

## 2018-09-05 NOTE — TELEPHONE ENCOUNTER
Patient wife has been advised in great detail and voiced understanding.     ----- Message from Jackie Yadav MD sent at 9/5/2018  6:29 AM EDT -----  Kidney function and potassium and white blood cell count are great. Liver enzymes are still a little high, but going down slightly. I want him to remain off statin and continue to hold HCTZ (BP medication) if his BP remains 140/80 or less at home. I probably still want all labs repeated in one more month unless Dr. Nielsen orders some when he sees him. Still ask wife to call clinic prior to confirm. No fluids needed prior the CT scan, but continue to stay well hydrated.

## 2018-09-07 ENCOUNTER — TELEPHONE (OUTPATIENT)
Dept: INTERNAL MEDICINE | Facility: CLINIC | Age: 73
End: 2018-09-07

## 2018-09-07 RX ORDER — LISINOPRIL 10 MG/1
10 TABLET ORAL DAILY
Qty: 30 TABLET | Refills: 1 | Status: SHIPPED | OUTPATIENT
Start: 2018-09-07 | End: 2018-09-07 | Stop reason: SDUPTHER

## 2018-09-07 RX ORDER — LISINOPRIL 10 MG/1
10 TABLET ORAL DAILY
Qty: 30 TABLET | Refills: 1 | Status: SHIPPED | OUTPATIENT
Start: 2018-09-07 | End: 2018-10-16 | Stop reason: SDUPTHER

## 2018-09-07 NOTE — TELEPHONE ENCOUNTER
Wife has been advised and voiced understanding. RX sent to mail order per wife. Wife advised to monitor BP over the weekend and log numbers.     ---- Message from Jackie Oliveira MD sent at 9/7/2018  4:46 PM EDT -----  Regarding: RE: PT LEAVING INFO & ? FOR DIANA Gama. I think that the 10mg is going to keep it down. I would give him another 10mg in the morning and please send him in a prescription for him.     ----- Message -----  From: Diana Landrum CMA  Sent: 9/7/2018   3:05 PM  To: Jackie Oliveira MD  Subject: FW: PT LEAVING INFO & ? FOR DIANA                   ----- Message -----  From: Prabha Kearney  Sent: 9/7/2018   2:55 PM  To: Tami Voss [facility]  Subject: PT LEAVING INFO & ? FOR DIANA OLIVEIRA    Pt's wife phoning back from this morning conversation with BP report.  Pt's BP is 128/81 with Lisinopril.    If it goes up again, can she give him some more Lisinopril?    [phone]

## 2018-09-07 NOTE — TELEPHONE ENCOUNTER
Patient wife has been advised and voiced understanding. No worrisome symptoms.     ----- Message from Jackie Yadav MD sent at 9/7/2018  9:29 AM EDT -----  Regarding: RE: BP HIGH - CAN HE TAKE MED?  Contact: [phone]    Yes. I think that is fine. Take 10mg of Lisinopril and call me later today with BP. Any symptoms?     ----- Message -----  From: Diana Landrum CMA  Sent: 9/7/2018   9:15 AM  To: Jackie Yadav MD  Subject: FW: BP HIGH - CAN HE TAKE MED?                       ----- Message -----  From: Saray Zuleta  Sent: 9/7/2018   8:49 AM  To: Diana Landrum CMA  Subject: BP HIGH - CAN HE TAKE MED?                       ROXANNE PT    Spouse called to say that his bp is 174/94 and she said that they were in yesterday to see dr Yadav and she is asking if she can give him one of her lisinoprils to bring it down?  Please call her back    Thanks!  saray

## 2018-09-10 ENCOUNTER — READMISSION MANAGEMENT (OUTPATIENT)
Dept: CALL CENTER | Facility: HOSPITAL | Age: 73
End: 2018-09-10

## 2018-09-10 NOTE — OUTREACH NOTE
COPD/PN Week 2 Survey      Responses   Facility patient discharged from?  [facility]   Does the patient have one of the following disease processes/diagnoses(primary or secondary)?  COPD/Pneumonia   Was the primary reason for admission:  Pneumonia   Week 2 attempt successful?  Yes   Call start time  1244   Rescheduled  Rescheduled-pt requested   Call end time  1244          Micaela Roberto RN

## 2018-09-11 ENCOUNTER — TELEPHONE (OUTPATIENT)
Dept: INTERNAL MEDICINE | Facility: CLINIC | Age: 73
End: 2018-09-11

## 2018-09-11 NOTE — TELEPHONE ENCOUNTER
"Patient wife has been advised and voiced understanding. Advised wife to contact our office with any questions or concerns.     ----- Message from Jackie Yadav MD sent at 9/10/2018  6:12 PM EDT -----  Regarding: RE:  It could be a combination of the hospitalization and the medications (since his body is not used to these). He went through a lot and was very sick and will take time for him to get better. He was there for 4-5 days, I would say it’s going to take him another week or two to fully feel back to normal.    ----- Message -----  From: Diana Landrum CMA  Sent: 9/10/2018  10:16 AM  To: Jackie Yadav MD    Wife called this AM to let you know that his BP this AM was 126/80 HR 80's. Wife states she has only had to give patient 1 RX lisinopril this weekend. Wife states she is \"playing it by ear\". Wife also wanted to know if the RX Flagyl and RX Levaquin could be causing patient to feel \"wiped out\", wife states patient has \"days he feels good and other he feels crummy\". Patient has been out of HOS 1 week now, please advise, thank you!    "

## 2018-09-13 ENCOUNTER — APPOINTMENT (OUTPATIENT)
Dept: CT IMAGING | Facility: HOSPITAL | Age: 73
End: 2018-09-13
Attending: INTERNAL MEDICINE

## 2018-09-13 ENCOUNTER — HOSPITAL ENCOUNTER (OUTPATIENT)
Dept: CT IMAGING | Facility: HOSPITAL | Age: 73
Discharge: HOME | End: 2018-09-13
Admitting: INTERNAL MEDICINE

## 2018-09-13 LAB — CREAT BLDA-MCNC: 0.9 MG/DL (ref 0.6–1.3)

## 2018-09-13 PROCEDURE — 74160 CT ABDOMEN W/CONTRAST: CPT

## 2018-09-13 PROCEDURE — 25010000002 IOPAMIDOL 61 % SOLUTION: Performed by: INTERNAL MEDICINE

## 2018-09-13 PROCEDURE — 82565 ASSAY OF CREATININE: CPT

## 2018-09-13 RX ADMIN — IOPAMIDOL 85 ML: 612 INJECTION, SOLUTION INTRAVENOUS at 10:31

## 2018-09-14 ENCOUNTER — READMISSION MANAGEMENT (OUTPATIENT)
Dept: CALL CENTER | Facility: HOSPITAL | Age: 73
End: 2018-09-14

## 2018-09-17 ENCOUNTER — TELEPHONE (OUTPATIENT)
Dept: INTERNAL MEDICINE | Facility: CLINIC | Age: 73
End: 2018-09-17

## 2018-09-17 NOTE — TELEPHONE ENCOUNTER
"Patient wife has been advised and voiced understanding. Wife states patient has \"good days and bad days\", wife states she is attempting to get patient out and walk through the neighborhood. Other than that, patient is \"he's not doing much\".     ----- Message from Jackie Yadav MD sent at 9/17/2018  7:48 AM EDT -----  CT scan shows that the liver lesions are somewhat smaller. One is basically gone and the largest is still visible, but slightly smaller. I will have them follow up with Dr. Nielsen this week as planned. How is he feeling?    "

## 2018-09-17 NOTE — TELEPHONE ENCOUNTER
CT scan shows that the liver lesions are somewhat smaller. One is basically gone and the largest is still visible, but slightly smaller. I will have them follow up with Dr. Nielsen this week as planned. How is he feeling?

## 2018-09-19 ENCOUNTER — DOCUMENTATION (OUTPATIENT)
Dept: INTERNAL MEDICINE | Facility: HOSPITAL | Age: 73
End: 2018-09-19

## 2018-09-24 ENCOUNTER — READMISSION MANAGEMENT (OUTPATIENT)
Dept: CALL CENTER | Facility: HOSPITAL | Age: 73
End: 2018-09-24

## 2018-09-24 NOTE — OUTREACH NOTE
"COPD/PN Week 3 Survey      Responses   Facility patient discharged from?  [facility]   Does the patient have one of the following disease processes/diagnoses(primary or secondary)?  COPD/Pneumonia   Was the primary reason for admission:  Pneumonia   Week 3 attempt successful?  Yes   Call start time  1430   Call end time  1442   List who call center can speak with  wife   Meds reviewed with patient/caregiver?  Yes   Is the patient taking all medications as directed (includes completed medication regime)?  Yes   Has the patient kept scheduled appointments due by today?  Yes   Has home health visited the patient within 72 hours of discharge?  N/A   Psychosocial issues?  No   What is the patient's perception of their health status since discharge?  Same   Additional teach back comments  Still having a lot of fatique. \"feels like crape\"   Is the patient/caregiver able to teach back signs and symptoms of worsening condition:  Fever/chills, Shortness of breath, Chest pain   Is the patient/caregiver able to teach back importance of completing antibiotic course of treatment?  Yes   Week 3 call completed?  Yes          Norma Bansal RN  "

## 2018-09-25 LAB — FUNGUS WND CULT: NORMAL

## 2018-10-02 ENCOUNTER — READMISSION MANAGEMENT (OUTPATIENT)
Dept: CALL CENTER | Facility: HOSPITAL | Age: 73
End: 2018-10-02

## 2018-10-02 NOTE — OUTREACH NOTE
COPD/PN Week 4 Survey      Responses   Facility patient discharged from?  [facility]   Does the patient have one of the following disease processes/diagnoses(primary or secondary)?  COPD/Pneumonia   Was the primary reason for admission:  Pneumonia   Week 4 attempt successful?  Yes   Call start time  1521   Call end time  1530   Discharge diagnosis  PNA,  diverticulitis,  Liver abscess   Is patient permission given to speak with other caregiver?  Yes   List who call center can speak with  wife   Meds reviewed with patient/caregiver?  Yes   Is the patient having any side effects they believe may be caused by any medication additions or changes?  No   Is the patient taking all medications as directed (includes completed medication regime)?  Yes   Has the patient kept scheduled appointments due by today?  Yes   Psychosocial issues?  No   What is the patient's perception of their health status since discharge?  Improving   Nursing Interventions  Nurse provided patient education   Are the patient's immunizations up to date?   Yes   Nursing interventions  Advised patient to discuss with provider at next visit   Is the patient/caregiver able to teach back the hierarchy of who to call/visit for symptoms/problems? PCP, Specialist, Home health nurse, Urgent Care, ED, 911  Yes   Additional teach back comments  Patient is finally starting to feel better. Getting his energy back.    Is the patient/caregiver able to teach back importance of completing antibiotic course of treatment?  -- [Patient's wife reports he did not have Pneumonia during stay- he had a liver abcess. ]   Week 4 call completed?  Yes   Would the patient like one additional call?  No   Graduated  Yes   Did the patient feel the follow up calls were helpful during their recovery period?  Yes   Was the number of calls appropriate?  Yes          John Merrill RN

## 2018-10-09 LAB
ALBUMIN SERPL-MCNC: 4.4 G/DL (ref 3.5–5.2)
ALBUMIN/CREAT UR: 4.8 MG/G CREAT (ref 0–30)
ALBUMIN/GLOB SERPL: 1.7 G/DL
ALP SERPL-CCNC: 74 U/L (ref 39–117)
ALT SERPL-CCNC: 19 U/L (ref 1–41)
APPEARANCE UR: CLEAR
AST SERPL-CCNC: 19 U/L (ref 1–40)
BACTERIA #/AREA URNS HPF: NORMAL /HPF
BASOPHILS # BLD AUTO: 0.05 10*3/MM3 (ref 0–0.2)
BASOPHILS NFR BLD AUTO: 0.8 % (ref 0–1.5)
BILIRUB SERPL-MCNC: 0.4 MG/DL (ref 0.1–1.2)
BILIRUB UR QL STRIP: NEGATIVE
BUN SERPL-MCNC: 20 MG/DL (ref 8–23)
BUN/CREAT SERPL: 19.2 (ref 7–25)
CALCIUM SERPL-MCNC: 9.5 MG/DL (ref 8.6–10.5)
CHLORIDE SERPL-SCNC: 105 MMOL/L (ref 98–107)
CHOLEST SERPL-MCNC: 262 MG/DL (ref 0–200)
CO2 SERPL-SCNC: 27.9 MMOL/L (ref 22–29)
COLOR UR: YELLOW
CREAT SERPL-MCNC: 1.04 MG/DL (ref 0.76–1.27)
CREAT UR-MCNC: 92.8 MG/DL
EOSINOPHIL # BLD AUTO: 0.26 10*3/MM3 (ref 0–0.7)
EOSINOPHIL NFR BLD AUTO: 4.4 % (ref 0.3–6.2)
EPI CELLS #/AREA URNS HPF: NORMAL /HPF
ERYTHROCYTE [DISTWIDTH] IN BLOOD BY AUTOMATED COUNT: 13.9 % (ref 11.5–14.5)
GLOBULIN SER CALC-MCNC: 2.6 GM/DL
GLUCOSE SERPL-MCNC: 109 MG/DL (ref 65–99)
GLUCOSE UR QL: NEGATIVE
HBA1C MFR BLD: 6.1 % (ref 4.8–5.6)
HCT VFR BLD AUTO: 47.2 % (ref 40.4–52.2)
HDLC SERPL-MCNC: 57 MG/DL (ref 40–60)
HGB BLD-MCNC: 14.4 G/DL (ref 13.7–17.6)
HGB UR QL STRIP: NEGATIVE
IMM GRANULOCYTES # BLD: 0 10*3/MM3 (ref 0–0.03)
IMM GRANULOCYTES NFR BLD: 0 % (ref 0–0.5)
KETONES UR QL STRIP: NEGATIVE
LDLC SERPL CALC-MCNC: 181 MG/DL (ref 0–100)
LDLC/HDLC SERPL: 3.17 {RATIO}
LEUKOCYTE ESTERASE UR QL STRIP: NEGATIVE
LYMPHOCYTES # BLD AUTO: 1.85 10*3/MM3 (ref 0.9–4.8)
LYMPHOCYTES NFR BLD AUTO: 31.4 % (ref 19.6–45.3)
MCH RBC QN AUTO: 29.3 PG (ref 27–32.7)
MCHC RBC AUTO-ENTMCNC: 30.5 G/DL (ref 32.6–36.4)
MCV RBC AUTO: 96.1 FL (ref 79.8–96.2)
MICRO URNS: NORMAL
MICRO URNS: NORMAL
MICROALBUMIN UR-MCNC: 4.5 UG/ML
MONOCYTES # BLD AUTO: 0.42 10*3/MM3 (ref 0.2–1.2)
MONOCYTES NFR BLD AUTO: 7.1 % (ref 5–12)
MUCOUS THREADS URNS QL MICRO: PRESENT /HPF
MYCOBACTERIUM SPEC CULT: NORMAL
NEUTROPHILS # BLD AUTO: 3.32 10*3/MM3 (ref 1.9–8.1)
NEUTROPHILS NFR BLD AUTO: 56.3 % (ref 42.7–76)
NIGHT BLUE STAIN TISS: NORMAL
NITRITE UR QL STRIP: NEGATIVE
PH UR STRIP: 5.5 [PH] (ref 5–7.5)
PLATELET # BLD AUTO: 269 10*3/MM3 (ref 140–500)
POTASSIUM SERPL-SCNC: 4.9 MMOL/L (ref 3.5–5.2)
PROT SERPL-MCNC: 7 G/DL (ref 6–8.5)
PROT UR QL STRIP: NEGATIVE
RBC # BLD AUTO: 4.91 10*6/MM3 (ref 4.6–6)
RBC #/AREA URNS HPF: NORMAL /HPF
SODIUM SERPL-SCNC: 145 MMOL/L (ref 136–145)
SP GR UR: 1.02 (ref 1–1.03)
TRIGL SERPL-MCNC: 121 MG/DL (ref 0–150)
URINALYSIS REFLEX: NORMAL
UROBILINOGEN UR STRIP-MCNC: 0.2 MG/DL (ref 0.2–1)
VLDLC SERPL CALC-MCNC: 24.2 MG/DL (ref 5–40)
WBC # BLD AUTO: 5.9 10*3/MM3 (ref 4.5–10.7)
WBC #/AREA URNS HPF: NORMAL /HPF

## 2018-10-10 ENCOUNTER — TELEPHONE (OUTPATIENT)
Dept: INTERNAL MEDICINE | Facility: CLINIC | Age: 73
End: 2018-10-10

## 2018-10-10 NOTE — TELEPHONE ENCOUNTER
Patient wife has been advised and voiced understanding.     ----- Message from Jackie Yadav MD sent at 10/9/2018 10:40 AM EDT -----  Labs look very good besides cholesterol since he has been off statin due to elevated liver enzymes. Okay to restart statin before his follow up since those numbers all better. Will go over labs in detail when he returns for follow up with him and his wife.

## 2018-10-16 ENCOUNTER — OFFICE VISIT (OUTPATIENT)
Dept: INTERNAL MEDICINE | Facility: CLINIC | Age: 73
End: 2018-10-16

## 2018-10-16 VITALS
OXYGEN SATURATION: 98 % | RESPIRATION RATE: 18 BRPM | BODY MASS INDEX: 29.2 KG/M2 | TEMPERATURE: 97.8 F | SYSTOLIC BLOOD PRESSURE: 146 MMHG | WEIGHT: 204 LBS | DIASTOLIC BLOOD PRESSURE: 80 MMHG | HEART RATE: 65 BPM | HEIGHT: 70 IN

## 2018-10-16 DIAGNOSIS — K21.00 GERD WITH ESOPHAGITIS: ICD-10-CM

## 2018-10-16 DIAGNOSIS — R97.20 ELEVATED PSA, LESS THAN 10 NG/ML: ICD-10-CM

## 2018-10-16 DIAGNOSIS — Z00.00 MEDICARE ANNUAL WELLNESS VISIT, SUBSEQUENT: Primary | ICD-10-CM

## 2018-10-16 DIAGNOSIS — N18.30 CKD (CHRONIC KIDNEY DISEASE) STAGE 3, GFR 30-59 ML/MIN (HCC): ICD-10-CM

## 2018-10-16 DIAGNOSIS — R73.01 IMPAIRED FASTING GLUCOSE: ICD-10-CM

## 2018-10-16 DIAGNOSIS — E78.2 MIXED HYPERLIPIDEMIA: ICD-10-CM

## 2018-10-16 DIAGNOSIS — T14.8XXA MUSCLE STRAIN: ICD-10-CM

## 2018-10-16 DIAGNOSIS — K14.8 LESION OF TONGUE: ICD-10-CM

## 2018-10-16 DIAGNOSIS — I10 ESSENTIAL HYPERTENSION: Primary | ICD-10-CM

## 2018-10-16 DIAGNOSIS — R53.83 FATIGUE, UNSPECIFIED TYPE: ICD-10-CM

## 2018-10-16 DIAGNOSIS — I10 ESSENTIAL HYPERTENSION: ICD-10-CM

## 2018-10-16 LAB
PSA SERPL-MCNC: 3.1 NG/ML (ref 0–4)
TSH SERPL DL<=0.005 MIU/L-ACNC: 1.63 MIU/ML (ref 0.27–4.2)
VIT B12 SERPL-MCNC: 718 PG/ML

## 2018-10-16 PROCEDURE — G0439 PPPS, SUBSEQ VISIT: HCPCS | Performed by: INTERNAL MEDICINE

## 2018-10-16 PROCEDURE — 99214 OFFICE O/P EST MOD 30 MIN: CPT | Performed by: INTERNAL MEDICINE

## 2018-10-16 PROCEDURE — 96160 PT-FOCUSED HLTH RISK ASSMT: CPT | Performed by: INTERNAL MEDICINE

## 2018-10-16 RX ORDER — SIMVASTATIN 20 MG
20 TABLET ORAL NIGHTLY
Qty: 90 TABLET | Refills: 3 | Status: SHIPPED | OUTPATIENT
Start: 2018-10-16 | End: 2019-10-24 | Stop reason: SDUPTHER

## 2018-10-16 RX ORDER — LISINOPRIL 10 MG/1
10 TABLET ORAL DAILY
Qty: 90 TABLET | Refills: 3 | Status: SHIPPED | OUTPATIENT
Start: 2018-10-16 | End: 2019-04-16 | Stop reason: SDUPTHER

## 2018-10-16 RX ORDER — LISINOPRIL 20 MG/1
20 TABLET ORAL DAILY
Qty: 90 TABLET | Refills: 3 | Status: SHIPPED | OUTPATIENT
Start: 2018-10-16 | End: 2018-10-16 | Stop reason: SDUPTHER

## 2018-10-16 RX ORDER — LISINOPRIL 10 MG/1
10 TABLET ORAL DAILY
Qty: 30 TABLET | Refills: 6 | Status: SHIPPED | OUTPATIENT
Start: 2018-10-16 | End: 2018-10-16 | Stop reason: SDUPTHER

## 2018-10-16 NOTE — PATIENT INSTRUCTIONS
Medicare Wellness  Personal Prevention Plan of Service     Date of Office Visit:  10/16/2018  Encounter Provider:  Jackie Yadav MD  Place of Service:  [facility]  Patient Name: Jung Blount  :  1945    As part of the Medicare Wellness portion of your visit today, we are providing you with this personalized preventive plan of services (PPPS). This plan is based upon recommendations of the [place] Preventive Services Task Force (USPSTF) and the Advisory Committee on Immunization Practices (ACIP).    This lists the preventive care services that should be considered, and provides dates of when you are due. Items listed as completed are up-to-date and do not require any further intervention.    Health Maintenance   Topic Date Due   • ZOSTER VACCINE (1 of 2) 1993   • COLONOSCOPY  2018   • INFLUENZA VACCINE  2018   • LIPID PANEL  10/08/2019   • MEDICARE ANNUAL WELLNESS  10/16/2019   • TDAP/TD VACCINES (2 - Td) 2027   • PNEUMOCOCCAL VACCINES (65+ LOW/MEDIUM RISK)  Completed   • AAA SCREEN (ONE-TIME)  Completed       Orders Placed This Encounter   Procedures   • TSH Rfx On Abnormal To Free T4   • PSA DIAGNOSTIC   • Vitamin B12       No Follow-up on file.

## 2018-10-16 NOTE — PROGRESS NOTES
QUICK REFERENCE INFORMATION:  The ABCs of the Annual Wellness Visit    Subsequent Medicare Wellness Visit    HEALTH RISK ASSESSMENT    1945    Recent Hospitalizations:  Yes, at [facility] and have already seen for hospital follow up         Current Medical Providers:  Patient Care Team:  Jackie Yadav MD as PCP - General (Internal Medicine)  Martin Saavedra MD as Consulting Physician (Urology)  Howie Epperson MD as Consulting Physician (Urology)  Heather Nielsen MD as Consulting Physician (Infectious Diseases)        Smoking Status:  History   Smoking Status   • Former Smoker   • Packs/day: 2.00   • Years: 47.00   Smokeless Tobacco   • Not on file     Comment: quit July 2007       Alcohol Consumption:  History   Alcohol Use   • Yes     Comment: Treated for alcoholism in 1991       Depression Screen:   PHQ-2/PHQ-9 Depression Screening 10/16/2018   Little interest or pleasure in doing things 0   Feeling down, depressed, or hopeless 1   Total Score 1       Health Habits and Functional and Cognitive Screening:  Functional & Cognitive Status 10/16/2018   Do you have difficulty preparing food and eating? No   Do you have difficulty bathing yourself, getting dressed or grooming yourself? No   Do you have difficulty using the toilet? No   Do you have difficulty moving around from place to place? No   Do you have trouble with steps or getting out of a bed or a chair? No   In the past year have you fallen or experienced a near fall? No   Current Diet Limited Junk Food   Dental Exam Not up to date   Eye Exam Not up to date   Exercise (times per week) 4 times per week   Current Exercise Activities Include Walking and exercises at the gym    Do you need help using the phone?  Yes   Are you deaf or do you have serious difficulty hearing?  Yes   Do you need help with transportation? No   Do you need help shopping? No   Do you need help preparing meals?  No   Do you need help with housework?  No   Do you need help with  laundry? No   Do you need help taking your medications? No   Do you need help managing money? No   Do you ever drive or ride in a car without wearing a seat belt? Yes   Have you felt unusual stress, anger or loneliness in the last month? No   Who do you live with? Spouse   If you need help, do you have trouble finding someone available to you? No   Have you been bothered in the last four weeks by sexual problems? No   Do you have difficulty concentrating, remembering or making decisions? No           Does the patient have evidence of cognitive impairment? No    Aspirin use counseling: Does not need ASA (and currently is not on it)      Recent Lab Results:  CMP:  Lab Results   Component Value Date     (H) 10/08/2018    BUN 20 10/08/2018    CREATININE 1.04 10/08/2018    EGFRIFNONA 70 10/08/2018    EGFRIFAFRI 84 10/08/2018    BCR 19.2 10/08/2018     10/08/2018    K 4.9 10/08/2018    CO2 27.9 10/08/2018    CALCIUM 9.5 10/08/2018    PROTENTOTREF 7.0 10/08/2018    ALBUMIN 4.40 10/08/2018    LABGLOBREF 2.6 10/08/2018    LABIL2 1.7 10/08/2018    BILITOT 0.4 10/08/2018    ALKPHOS 74 10/08/2018    AST 19 10/08/2018    ALT 19 10/08/2018     Lipid Panel:  Lab Results   Component Value Date    TRIG 121 10/08/2018    HDL 57 10/08/2018    VLDL 24.2 10/08/2018    LDLHDL 3.17 10/08/2018     HbA1c:  Lab Results   Component Value Date    HGBA1C 6.10 (H) 10/08/2018       Visual Acuity:  No exam data present    Age-appropriate Screening Schedule:  Refer to the list below for future screening recommendations based on patient's age, sex and/or medical conditions. Orders for these recommended tests are listed in the plan section. The patient has been provided with a written plan.    Health Maintenance   Topic Date Due   • ZOSTER VACCINE (1 of 2) 03/02/1993   • COLONOSCOPY  06/01/2018   • INFLUENZA VACCINE  08/01/2018   • LIPID PANEL  10/08/2019   • TDAP/TD VACCINES (2 - Td) 08/24/2027   • PNEUMOCOCCAL VACCINES (65+ LOW/MEDIUM  RISK)  Completed        Subjective   History of Present Illness    Jung Blount is a 73 y.o. male who presents for an Subsequent Wellness Visit.    He is still fatigued after hospitaliization particularly with actvitity and exercising. No weight loss. Eating poorly on most days. Likes to eat chocolate, potato chips and ice cream particularly before bedtime.      He is having a muscle cramp on the left side for a few weeks when he walks. No hernia or bulge. No redness or swelling.     He has had some burning in central chest area since leaving hospital. He is snacking at night on a lot of junk food. Does not radiates. Rolaids helped the burning. No CP or change in stools.     They did not have a good experience with the ID doctor. He is feeling well from a stomach standpoint. No change in the stools. They do not want to see another ID doctor. They are going to set up c-scope with Dr. Morales at [facility]. LFT's are all better. He has been off antibiotics for a few weeks.     He has chronic HTN and is doing well on ACE and HCTZ. Runs around 130-140/80 at home. Unsure if it ever runs higher. Tolerates medication well. No CP or SOB or dizziness.     He has chronic HLD and we were holding statin while his LFT's were elevated. Started back up on statin this week. LDL reviewed and was 180 and needs to continue. Tolerating well.     The following portions of the patient's history were reviewed and updated as appropriate: allergies, current medications, past family history, past medical history, past social history, past surgical history and problem list.    Outpatient Medications Prior to Visit   Medication Sig Dispense Refill   • B-Complex tablet Take 1 tablet by mouth daily.     • cetirizine (zyrTEC) 10 MG tablet Take 10 mg by mouth Daily.     • melatonin 5 MG tablet tablet Take 5 mg by mouth At Night As Needed.     • Multiple Vitamin (MULTI VITAMIN DAILY PO) Take 1 tablet by mouth daily.     • Omega-3 Fatty Acids (FISH  OIL) 1000 MG capsule capsule Take 1,000 mg by mouth daily with breakfast.     • hydrochlorothiazide (HYDRODIURIL) 12.5 MG tablet Take 1 tablet by mouth Daily. (Patient taking differently: Take 10 mg by mouth Daily.) 90 tablet 3   • lisinopril (PRINIVIL,ZESTRIL) 10 MG tablet Take 1 tablet by mouth Daily. 30 tablet 1   • simvastatin (ZOCOR) 20 MG tablet Take 1 tablet by mouth Every Night. 90 tablet 3     No facility-administered medications prior to visit.        Patient Active Problem List   Diagnosis   • Hypertension   • Hyperlipidemia   • Medicare annual wellness visit, subsequent   • Elevated PSA, less than 10 ng/ml   • Impaired fasting glucose   • [place] (hard of hearing)   • Diverticula of colon   • Aortic dilatation (CMS/HCC)   • Memory deficits   • Altered mental status   • Elevated LFTs   • CKD (chronic kidney disease) stage 3, GFR 30-59 ml/min (CMS/HCC)   • Weakness   • Confusion   • Diverticulitis   • Skull lesion   • Liver abscess   • Fatigue   • Lesion of tongue   • GERD with esophagitis       Advance Care Planning:  has an advance directive - a copy HAS NOT been provided. Have asked the patient to send this to us to add to record.    Identification of Risk Factors:  Risk factors include: weight , unhealthy diet, cardiovascular risk and inactivity.    Review of Systems   Constitutional: Positive for fatigue (with exertion after hospitalization ). Negative for chills and fever.   HENT: Negative for congestion and mouth sores.    Respiratory: Negative for cough and shortness of breath.    Cardiovascular: Negative for chest pain, palpitations and leg swelling.   Gastrointestinal: Negative for abdominal pain, constipation, diarrhea and vomiting.   Genitourinary: Negative for difficulty urinating and dysuria.   Musculoskeletal: Positive for myalgias (right groin/upper leg ).   Skin: Negative for rash.   Allergic/Immunologic: Negative for environmental allergies and food allergies.   Neurological: Negative for  dizziness and headaches.   Hematological: Negative for adenopathy.   Psychiatric/Behavioral: Negative for sleep disturbance. The patient is not nervous/anxious.        Compared to one year ago, the patient feels his physical health is worse.  Compared to one year ago, the patient feels his mental health is the same.    Objective     Physical Exam   Constitutional: He is oriented to person, place, and time. He appears well-developed and well-nourished. No distress.   HENT:   Head: Normocephalic and atraumatic.   Right Ear: External ear normal. Tympanic membrane is scarred. Decreased hearing is noted.   Left Ear: External ear normal. Tympanic membrane is scarred. Decreased hearing is noted.   Nose: Nose normal.   Mouth/Throat: Uvula is midline and mucous membranes are normal. Oral lesions (raised central lesion on back of tongue ) present. No uvula swelling. Posterior oropharyngeal erythema (cobblestoning ) present. No oropharyngeal exudate or posterior oropharyngeal edema. Tonsils are 0 on the right. Tonsils are 0 on the left. No tonsillar exudate.   Eyes: Conjunctivae are normal. Right eye exhibits no discharge. Left eye exhibits no discharge. No scleral icterus.   Neck: Neck supple.   Cardiovascular: Normal rate, regular rhythm and normal heart sounds.  Exam reveals no gallop and no friction rub.    No murmur heard.  Pulmonary/Chest: Effort normal and breath sounds normal. No respiratory distress. He has no wheezes. He has no rales.   Abdominal: Soft. Bowel sounds are normal. He exhibits no distension and no mass. There is no tenderness. There is no guarding.   Lymphadenopathy:     He has no cervical adenopathy.   Neurological: He is alert and oriented to person, place, and time. He exhibits normal muscle tone. Coordination normal.   Skin: Skin is warm. Capillary refill takes less than 2 seconds. No rash noted.   Psychiatric: He has a normal mood and affect. His behavior is normal.   Nursing note and vitals  "reviewed.      Vitals:    10/16/18 1010   BP: 146/80   BP Location: Left arm   Patient Position: Sitting   Cuff Size: Large Adult   Pulse: 65   Resp: 18   Temp: 97.8 °F (36.6 °C)   TempSrc: Oral   SpO2: 98%   Weight: 92.5 kg (204 lb)   Height: 177.8 cm (70\")   PainSc: 0-No pain       Patient's Body mass index is 29.27 kg/m². BMI is above normal parameters. Recommendations include: exercise counseling and nutrition counseling.      Assessment/Plan   Patient Self-Management and Personalized Health Advice  The patient has been provided with information about: diet, exercise, weight management, prevention of cardiac or vascular disease, the relationship between weight and GERD, supplements and mental health concerns and preventive services including:   · Colorectal cancer screening, colonoscopy referral placed, Diabetes screening, see lab orders, Exercise counseling provided, Fall Risk assessment done, and Shingrix (flu shot encouarged, but not given due to us being out of this).    Visit Diagnoses:    ICD-10-CM ICD-9-CM   1. Medicare annual wellness visit, subsequent Z00.00 V70.0   2. CKD (chronic kidney disease) stage 3, GFR 30-59 ml/min (CMS/[facility]) N18.3 585.3   3. Essential hypertension I10 401.9   4. Mixed hyperlipidemia E78.2 272.2   5. Impaired fasting glucose R73.01 790.21   6. Elevated PSA, less than 10 ng/ml R97.20 790.93   7. Fatigue, unspecified type R53.83 780.79   8. Muscle strain T14.8XXA 848.9   9. Lesion of tongue K14.8 529.8   10. GERD with esophagitis K21.0 530.11     CKD has resolved since we stopped HCTZ. Will continue to monitor closely. Avoids NSAID's and push fluids.     HTN is under fair control and patient will continue to monitor with home BP cuff. No side effects from medications. Will continue current regimen of Lisinopril at 10mg and if not running around 130/80, will increase to 20mg. Wife to monitor and call if he needs new prescription. Will follow up in 6 months      Patient's cholesterol " is not under good control because he has been off of his statin. Will continue current regimen of Zocor which has already restarted. No side effects from medications reported. Will follow up in 6 months to monitor levels.     Patient's BG's have been elevated and their HgA1c is 6.1%. Discussed reduction in sweets. Will continue to focus on diet and exercise. Will follow up in 6 months.     With the fatigue, I think that this is multifactorial. He is eating too many sweets and not exercising as much and he also had a very complicated admission. Will check TSH, B12 and PSA today and will get him set up for c-scope since he is due and there are some liver abscesses that are caused by colon cancer. He will have f/u with urology and we will send them PSA results.     Stretching for muscle strain.     Start Nexium and work on diet for GERD. If not better with simple measures, will need to have EGD with Dr. Morales.     See dentist for tongue lesion.       Orders Placed This Encounter   Procedures   • TSH Rfx On Abnormal To Free T4   • PSA DIAGNOSTIC   • Vitamin B12       Outpatient Encounter Prescriptions as of 10/16/2018   Medication Sig Dispense Refill   • B-Complex tablet Take 1 tablet by mouth daily.     • cetirizine (zyrTEC) 10 MG tablet Take 10 mg by mouth Daily.     • Esomeprazole Magnesium (NEXIUM 24HR PO) Take 20 mg by mouth Daily.     • lisinopril (PRINIVIL,ZESTRIL) 10 MG tablet Take 1 tablet by mouth Daily. 90 tablet 3   • melatonin 5 MG tablet tablet Take 5 mg by mouth At Night As Needed.     • Multiple Vitamin (MULTI VITAMIN DAILY PO) Take 1 tablet by mouth daily.     • Omega-3 Fatty Acids (FISH OIL) 1000 MG capsule capsule Take 1,000 mg by mouth daily with breakfast.     • simvastatin (ZOCOR) 20 MG tablet Take 1 tablet by mouth Every Night. 90 tablet 3   • [DISCONTINUED] hydrochlorothiazide (HYDRODIURIL) 12.5 MG tablet Take 1 tablet by mouth Daily. (Patient taking differently: Take 10 mg by mouth Daily.) 90  tablet 3   • [DISCONTINUED] lisinopril (PRINIVIL,ZESTRIL) 10 MG tablet Take 1 tablet by mouth Daily. 30 tablet 1   • [DISCONTINUED] lisinopril (PRINIVIL,ZESTRIL) 10 MG tablet Take 1 tablet by mouth Daily. 30 tablet 6   • [DISCONTINUED] lisinopril (PRINIVIL,ZESTRIL) 20 MG tablet Take 1 tablet by mouth Daily. 90 tablet 3   • [DISCONTINUED] simvastatin (ZOCOR) 20 MG tablet Take 1 tablet by mouth Every Night. 90 tablet 3     No facility-administered encounter medications on file as of 10/16/2018.        Reviewed use of high risk medication in the elderly: yes  Reviewed for potential of harmful drug interactions in the elderly: yes    Follow Up:  Return in about 6 months (around 4/16/2019) for Recheck.     An After Visit Summary and PPPS with all of these plans were given to the patient.

## 2018-10-22 ENCOUNTER — TELEPHONE (OUTPATIENT)
Dept: INTERNAL MEDICINE | Facility: CLINIC | Age: 73
End: 2018-10-22

## 2018-10-22 NOTE — TELEPHONE ENCOUNTER
Patient wife has been advised and voiced understanding. Wife states she will contact previous GI MD and set patient up for c-scope.     ----- Message from Jackie Yadav MD sent at 10/17/2018  5:20 AM EDT -----  Thyroid, B12 and PSA are all normal. I want him to get set up for c-scope and work on his diet and is still extremely fatigued once we know test is normal and he had done a better job with eating, then we can pursue further workup.

## 2019-04-09 LAB
ALBUMIN SERPL-MCNC: 4.5 G/DL (ref 3.5–4.8)
ALBUMIN/GLOB SERPL: 2 {RATIO} (ref 1.2–2.2)
ALP SERPL-CCNC: 81 IU/L (ref 39–117)
ALT SERPL-CCNC: 34 IU/L (ref 0–44)
APPEARANCE UR: CLEAR
AST SERPL-CCNC: 42 IU/L (ref 0–40)
BACTERIA #/AREA URNS HPF: NORMAL /[HPF]
BASOPHILS # BLD AUTO: 0.1 X10E3/UL (ref 0–0.2)
BASOPHILS NFR BLD AUTO: 1 %
BILIRUB SERPL-MCNC: 0.5 MG/DL (ref 0–1.2)
BILIRUB UR QL STRIP: NEGATIVE
BUN SERPL-MCNC: 17 MG/DL (ref 8–27)
BUN/CREAT SERPL: 17 (ref 10–24)
CALCIUM SERPL-MCNC: 9.2 MG/DL (ref 8.6–10.2)
CHLORIDE SERPL-SCNC: 105 MMOL/L (ref 96–106)
CHOLEST SERPL-MCNC: 195 MG/DL (ref 100–199)
CO2 SERPL-SCNC: 23 MMOL/L (ref 20–29)
COLOR UR: YELLOW
CREAT SERPL-MCNC: 1.03 MG/DL (ref 0.76–1.27)
EOSINOPHIL # BLD AUTO: 0.2 X10E3/UL (ref 0–0.4)
EOSINOPHIL NFR BLD AUTO: 3 %
EPI CELLS #/AREA URNS HPF: NORMAL /HPF
ERYTHROCYTE [DISTWIDTH] IN BLOOD BY AUTOMATED COUNT: 14 % (ref 12.3–15.4)
GLOBULIN SER CALC-MCNC: 2.3 G/DL (ref 1.5–4.5)
GLUCOSE SERPL-MCNC: 110 MG/DL (ref 65–99)
GLUCOSE UR QL: NEGATIVE
HBA1C MFR BLD: 5.9 % (ref 4.8–5.6)
HCT VFR BLD AUTO: 43.7 % (ref 37.5–51)
HDLC SERPL-MCNC: 67 MG/DL
HGB BLD-MCNC: 14.5 G/DL (ref 13–17.7)
HGB UR QL STRIP: NEGATIVE
IMM GRANULOCYTES # BLD AUTO: 0 X10E3/UL (ref 0–0.1)
IMM GRANULOCYTES NFR BLD AUTO: 0 %
KETONES UR QL STRIP: NEGATIVE
LDLC SERPL CALC-MCNC: 102 MG/DL (ref 0–99)
LDLC/HDLC SERPL: 1.5 RATIO (ref 0–3.6)
LEUKOCYTE ESTERASE UR QL STRIP: NEGATIVE
LYMPHOCYTES # BLD AUTO: 1.7 X10E3/UL (ref 0.7–3.1)
LYMPHOCYTES NFR BLD AUTO: 27 %
MCH RBC QN AUTO: 30.9 PG (ref 26.6–33)
MCHC RBC AUTO-ENTMCNC: 33.2 G/DL (ref 31.5–35.7)
MCV RBC AUTO: 93 FL (ref 79–97)
MICRO URNS: NORMAL
MICRO URNS: NORMAL
MONOCYTES # BLD AUTO: 0.6 X10E3/UL (ref 0.1–0.9)
MONOCYTES NFR BLD AUTO: 9 %
NEUTROPHILS # BLD AUTO: 3.9 X10E3/UL (ref 1.4–7)
NEUTROPHILS NFR BLD AUTO: 60 %
NITRITE UR QL STRIP: NEGATIVE
PH UR STRIP: 6.5 [PH] (ref 5–7.5)
PLATELET # BLD AUTO: 256 X10E3/UL (ref 150–379)
POTASSIUM SERPL-SCNC: 4.9 MMOL/L (ref 3.5–5.2)
PROT SERPL-MCNC: 6.8 G/DL (ref 6–8.5)
PROT UR QL STRIP: NEGATIVE
RBC # BLD AUTO: 4.69 X10E6/UL (ref 4.14–5.8)
RBC #/AREA URNS HPF: NORMAL /HPF
SODIUM SERPL-SCNC: 143 MMOL/L (ref 134–144)
SP GR UR: 1.01 (ref 1–1.03)
TRIGL SERPL-MCNC: 129 MG/DL (ref 0–149)
URINALYSIS REFLEX: NORMAL
UROBILINOGEN UR STRIP-MCNC: 0.2 MG/DL (ref 0.2–1)
VLDLC SERPL CALC-MCNC: 26 MG/DL (ref 5–40)
WBC # BLD AUTO: 6.5 X10E3/UL (ref 3.4–10.8)
WBC #/AREA URNS HPF: NORMAL /HPF

## 2019-04-16 ENCOUNTER — OFFICE VISIT (OUTPATIENT)
Dept: INTERNAL MEDICINE | Facility: CLINIC | Age: 74
End: 2019-04-16

## 2019-04-16 VITALS
HEIGHT: 70 IN | HEART RATE: 90 BPM | WEIGHT: 209 LBS | OXYGEN SATURATION: 96 % | RESPIRATION RATE: 16 BRPM | DIASTOLIC BLOOD PRESSURE: 92 MMHG | SYSTOLIC BLOOD PRESSURE: 154 MMHG | TEMPERATURE: 98 F | BODY MASS INDEX: 29.92 KG/M2

## 2019-04-16 DIAGNOSIS — R73.01 IMPAIRED FASTING GLUCOSE: ICD-10-CM

## 2019-04-16 DIAGNOSIS — E78.2 MIXED HYPERLIPIDEMIA: ICD-10-CM

## 2019-04-16 DIAGNOSIS — H61.23 BILATERAL IMPACTED CERUMEN: ICD-10-CM

## 2019-04-16 DIAGNOSIS — I10 ESSENTIAL HYPERTENSION: Primary | ICD-10-CM

## 2019-04-16 PROCEDURE — 69210 REMOVE IMPACTED EAR WAX UNI: CPT | Performed by: INTERNAL MEDICINE

## 2019-04-16 PROCEDURE — 99214 OFFICE O/P EST MOD 30 MIN: CPT | Performed by: INTERNAL MEDICINE

## 2019-04-16 RX ORDER — LISINOPRIL 20 MG/1
20 TABLET ORAL DAILY
Qty: 90 TABLET | Refills: 2 | Status: SHIPPED | OUTPATIENT
Start: 2019-04-16 | End: 2019-04-25 | Stop reason: SDUPTHER

## 2019-04-16 NOTE — PATIENT INSTRUCTIONS
"DASH Eating Plan  DASH stands for \"Dietary Approaches to Stop Hypertension.\" The DASH eating plan is a healthy eating plan that has been shown to reduce high blood pressure (hypertension). It may also reduce your risk for type 2 diabetes, heart disease, and stroke. The DASH eating plan may also help with weight loss.  What are tips for following this plan?  General guidelines  · Avoid eating more than 2,300 mg (milligrams) of salt (sodium) a day. If you have hypertension, you may need to reduce your sodium intake to 1,500 mg a day.  · Limit alcohol intake to no more than 1 drink a day for nonpregnant women and 2 drinks a day for men. One drink equals 12 oz of beer, 5 oz of wine, or 1½ oz of hard liquor.  · Work with your health care provider to maintain a healthy body weight or to lose weight. Ask what an ideal weight is for you.  · Get at least 30 minutes of exercise that causes your heart to beat faster (aerobic exercise) most days of the week. Activities may include walking, swimming, or biking.  · Work with your health care provider or diet and nutrition specialist (dietitian) to adjust your eating plan to your individual calorie needs.  Reading food labels  · Check food labels for the amount of sodium per serving. Choose foods with less than 5 percent of the Daily Value of sodium. Generally, foods with less than 300 mg of sodium per serving fit into this eating plan.  · To find whole grains, look for the word \"whole\" as the first word in the ingredient list.  Shopping  · Buy products labeled as \"low-sodium\" or \"no salt added.\"  · Buy fresh foods. Avoid canned foods and premade or frozen meals.  Cooking  · Avoid adding salt when cooking. Use salt-free seasonings or herbs instead of table salt or sea salt. Check with your health care provider or pharmacist before using salt substitutes.  · Do not wylie foods. Cook foods using healthy methods such as baking, boiling, grilling, and broiling instead.  · Cook with " heart-healthy oils, such as olive, canola, soybean, or sunflower oil.  Meal planning    · Eat a balanced diet that includes:  ? 5 or more servings of fruits and vegetables each day. At each meal, try to fill half of your plate with fruits and vegetables.  ? Up to 6-8 servings of whole grains each day.  ? Less than 6 oz of lean meat, poultry, or fish each day. A 3-oz serving of meat is about the same size as a deck of cards. One egg equals 1 oz.  ? 2 servings of low-fat dairy each day.  ? A serving of nuts, seeds, or beans 5 times each week.  ? Heart-healthy fats. Healthy fats called Omega-3 fatty acids are found in foods such as flaxseeds and coldwater fish, like sardines, salmon, and mackerel.  · Limit how much you eat of the following:  ? Canned or prepackaged foods.  ? Food that is high in trans fat, such as fried foods.  ? Food that is high in saturated fat, such as fatty meat.  ? Sweets, desserts, sugary drinks, and other foods with added sugar.  ? Full-fat dairy products.  · Do not salt foods before eating.  · Try to eat at least 2 vegetarian meals each week.  · Eat more home-cooked food and less restaurant, buffet, and fast food.  · When eating at a restaurant, ask that your food be prepared with less salt or no salt, if possible.  What foods are recommended?  The items listed may not be a complete list. Talk with your dietitian about what dietary choices are best for you.  Grains  Whole-grain or whole-wheat bread. Whole-grain or whole-wheat pasta. Brown rice. Oatmeal. Quinoa. Bulgur. Whole-grain and low-sodium cereals. Adriana bread. Low-fat, low-sodium crackers. Whole-wheat flour tortillas.  Vegetables  Fresh or frozen vegetables (raw, steamed, roasted, or grilled). Low-sodium or reduced-sodium tomato and vegetable juice. Low-sodium or reduced-sodium tomato sauce and tomato paste. Low-sodium or reduced-sodium canned vegetables.  Fruits  All fresh, dried, or frozen fruit. Canned fruit in natural juice (without  added sugar).  Meat and other protein foods  Skinless chicken or turkey. Ground chicken or turkey. Pork with fat trimmed off. Fish and seafood. Egg whites. Dried beans, peas, or lentils. Unsalted nuts, nut butters, and seeds. Unsalted canned beans. Lean cuts of beef with fat trimmed off. Low-sodium, lean deli meat.  Dairy  Low-fat (1%) or fat-free (skim) milk. Fat-free, low-fat, or reduced-fat cheeses. Nonfat, low-sodium ricotta or cottage cheese. Low-fat or nonfat yogurt. Low-fat, low-sodium cheese.  Fats and oils  Soft margarine without trans fats. Vegetable oil. Low-fat, reduced-fat, or light mayonnaise and salad dressings (reduced-sodium). Canola, safflower, olive, soybean, and sunflower oils. Avocado.  Seasoning and other foods  Herbs. Spices. Seasoning mixes without salt. Unsalted popcorn and pretzels. Fat-free sweets.  What foods are not recommended?  The items listed may not be a complete list. Talk with your dietitian about what dietary choices are best for you.  Grains  Baked goods made with fat, such as croissants, muffins, or some breads. Dry pasta or rice meal packs.  Vegetables  Creamed or fried vegetables. Vegetables in a cheese sauce. Regular canned vegetables (not low-sodium or reduced-sodium). Regular canned tomato sauce and paste (not low-sodium or reduced-sodium). Regular tomato and vegetable juice (not low-sodium or reduced-sodium). Pickles. Olives.  Fruits  Canned fruit in a light or heavy syrup. Fried fruit. Fruit in cream or butter sauce.  Meat and other protein foods  Fatty cuts of meat. Ribs. Fried meat. Michel. Sausage. Bologna and other processed lunch meats. Salami. Fatback. Hotdogs. Bratwurst. Salted nuts and seeds. Canned beans with added salt. Canned or smoked fish. Whole eggs or egg yolks. Chicken or turkey with skin.  Dairy  Whole or 2% milk, cream, and half-and-half. Whole or full-fat cream cheese. Whole-fat or sweetened yogurt. Full-fat cheese. Nondairy creamers. Whipped toppings.  Processed cheese and cheese spreads.  Fats and oils  Butter. Stick margarine. Lard. Shortening. Ghee. Michel fat. Tropical oils, such as coconut, palm kernel, or palm oil.  Seasoning and other foods  Salted popcorn and pretzels. Onion salt, garlic salt, seasoned salt, table salt, and sea salt. Worcestershire sauce. Tartar sauce. Barbecue sauce. Teriyaki sauce. Soy sauce, including reduced-sodium. Steak sauce. Canned and packaged gravies. Fish sauce. Oyster sauce. Cocktail sauce. Horseradish that you find on the shelf. Ketchup. Mustard. Meat flavorings and tenderizers. Bouillon cubes. Hot sauce and Tabasco sauce. Premade or packaged marinades. Premade or packaged taco seasonings. Relishes. Regular salad dressings.  Where to find more information:  · National Heart, Lung, and Blood [place]: www.nhlbi.nih.gov  · American Heart Association: www.heart.org  Summary  · The DASH eating plan is a healthy eating plan that has been shown to reduce high blood pressure (hypertension). It may also reduce your risk for type 2 diabetes, heart disease, and stroke.  · With the DASH eating plan, you should limit salt (sodium) intake to 2,300 mg a day. If you have hypertension, you may need to reduce your sodium intake to 1,500 mg a day.  · When on the DASH eating plan, aim to eat more fresh fruits and vegetables, whole grains, lean proteins, low-fat dairy, and heart-healthy fats.  · Work with your health care provider or diet and nutrition specialist (dietitian) to adjust your eating plan to your individual calorie needs.  This information is not intended to replace advice given to you by your health care provider. Make sure you discuss any questions you have with your health care provider.  Document Released: 12/06/2012 Document Revised: 12/11/2017 Document Reviewed: 12/11/2017  [employer] Patient Education © 2019 [employer]

## 2019-04-16 NOTE — PROGRESS NOTES
Jung Blount is a 74 y.o. male, who presents with a chief complaint of   Chief Complaint   Patient presents with   • Follow-up     6 x month f/u, lab results   • Hypertension       75 yo F here for follow up. He is doing well, but hearing is worse. He has not had his hearing aids checked. Feels like he has wax stuck in them.     He has chronic HTN and his BP has been running higher at home. Running around 150/90 at home. Eating a lot of salty foods. He is taking 20mg of Lisinopril when he is running around 145/90 at home.     He has IFG and is not eating well during the day. Eating a lot of sweets. Going to the gym every day.     He has chronic HLD and is doing well on Zocor. He tolerates well with no muscle cramps. He does not have CP or SOB.     He does not want to have anymore colonscopoies. Last one was in 2008.          The following portions of the patient's history were reviewed and updated as appropriate: allergies, current medications, past family history, past medical history, past social history, past surgical history and problem list.    Allergies: Patient has no known allergies.    Current Outpatient Medications:   •  B-Complex tablet, Take 1 tablet by mouth daily., Disp: , Rfl:   •  cetirizine (zyrTEC) 10 MG tablet, Take 10 mg by mouth Daily., Disp: , Rfl:   •  lisinopril (PRINIVIL,ZESTRIL) 20 MG tablet, Take 1 tablet by mouth Daily., Disp: 90 tablet, Rfl: 2  •  melatonin 5 MG tablet tablet, Take 5 mg by mouth At Night As Needed., Disp: , Rfl:   •  Multiple Vitamin (MULTI VITAMIN DAILY PO), Take 1 tablet by mouth daily., Disp: , Rfl:   •  Omega-3 Fatty Acids (FISH OIL) 1000 MG capsule capsule, Take 1,000 mg by mouth daily with breakfast., Disp: , Rfl:   •  simvastatin (ZOCOR) 20 MG tablet, Take 1 tablet by mouth Every Night., Disp: 90 tablet, Rfl: 3  Medications Discontinued During This Encounter   Medication Reason   • lisinopril (PRINIVIL,ZESTRIL) 10 MG tablet Reorder   • Esomeprazole  "Magnesium (NEXIUM 24HR PO) *Therapy completed       Review of Systems   Constitutional: Negative for chills and fever.   HENT: Positive for hearing loss. Negative for congestion and rhinorrhea.    Respiratory: Negative for cough and shortness of breath.    Gastrointestinal: Negative for abdominal pain, constipation, diarrhea and nausea.             /92 (BP Location: Left arm, Patient Position: Sitting, Cuff Size: Large Adult)   Pulse 90   Temp 98 °F (36.7 °C) (Oral)   Resp 16   Ht 177.8 cm (70\")   Wt 94.8 kg (209 lb)   SpO2 96%   BMI 29.99 kg/m²         Physical Exam   Constitutional: He is oriented to person, place, and time. He appears well-developed and well-nourished. No distress.   HENT:   Head: Normocephalic and atraumatic.   Right Ear: External ear normal. Decreased hearing is noted.   Left Ear: External ear normal. Decreased hearing is noted.   Mouth/Throat: Oropharynx is clear and moist. No oropharyngeal exudate.   Cerumen impaction bilaterally    Eyes: Conjunctivae are normal. Right eye exhibits no discharge. Left eye exhibits no discharge. No scleral icterus.   Neck: Neck supple.   Cardiovascular: Normal rate, regular rhythm and normal heart sounds. Exam reveals no gallop and no friction rub.   No murmur heard.  Pulmonary/Chest: Effort normal and breath sounds normal. No respiratory distress. He has no wheezes. He has no rales.   Abdominal: Soft. Bowel sounds are normal. He exhibits no distension and no mass. There is no tenderness. There is no guarding.   Lymphadenopathy:     He has no cervical adenopathy.   Neurological: He is alert and oriented to person, place, and time.   Skin: Skin is warm. No rash noted.   Psychiatric: He has a normal mood and affect. His behavior is normal.   Nursing note and vitals reviewed.        Results for orders placed or performed in visit on 10/16/18   Comprehensive Metabolic Panel   Result Value Ref Range    Glucose 110 (H) 65 - 99 mg/dL    BUN 17 8 - 27 " mg/dL    Creatinine 1.03 0.76 - 1.27 mg/dL    eGFR Non African Am 70 >59 mL/min/1.73    eGFR African Am 81 >59 mL/min/1.73    BUN/Creatinine Ratio 17 10 - 24    Sodium 143 134 - 144 mmol/L    Potassium 4.9 3.5 - 5.2 mmol/L    Chloride 105 96 - 106 mmol/L    Total CO2 23 20 - 29 mmol/L    Calcium 9.2 8.6 - 10.2 mg/dL    Total Protein 6.8 6.0 - 8.5 g/dL    Albumin 4.5 3.5 - 4.8 g/dL    Globulin 2.3 1.5 - 4.5 g/dL    A/G Ratio 2.0 1.2 - 2.2    Total Bilirubin 0.5 0.0 - 1.2 mg/dL    Alkaline Phosphatase 81 39 - 117 IU/L    AST (SGOT) 42 (H) 0 - 40 IU/L    ALT (SGPT) 34 0 - 44 IU/L   Lipid Panel With LDL / HDL Ratio   Result Value Ref Range    Total Cholesterol 195 100 - 199 mg/dL    Triglycerides 129 0 - 149 mg/dL    HDL Cholesterol 67 >39 mg/dL    VLDL Cholesterol 26 5 - 40 mg/dL    LDL Cholesterol  102 (H) 0 - 99 mg/dL    LDL/HDL Ratio 1.5 0.0 - 3.6 ratio   Urinalysis With Culture If Indicated - Urine, Clean Catch   Result Value Ref Range    Specific Gravity, UA 1.009 1.005 - 1.030    pH, UA 6.5 5.0 - 7.5    Color, UA Yellow Yellow    Appearance, UA Clear Clear    Leukocytes, UA Negative Negative    Protein Negative Negative/Trace    Glucose, UA Negative Negative    Ketones Negative Negative    Blood, UA Negative Negative    Bilirubin, UA Negative Negative    Urobilinogen, UA 0.2 0.2 - 1.0 mg/dL    Nitrite, UA Negative Negative    Microscopic Examination Comment     Microscopic Examination See below:     Urinalysis Reflex Comment    Hemoglobin A1c   Result Value Ref Range    Hemoglobin A1C 5.9 (H) 4.8 - 5.6 %   Microscopic Examination -   Result Value Ref Range    WBC, UA 0-5 0 - 5 /hpf    RBC, UA None seen 0 - 2 /hpf    Epithelial Cells (non renal) None seen 0 - 10 /hpf    Bacteria, UA None seen None seen/Few   CBC & Differential   Result Value Ref Range    WBC 6.5 3.4 - 10.8 x10E3/uL    RBC 4.69 4.14 - 5.80 x10E6/uL    Hemoglobin 14.5 13.0 - 17.7 g/dL    Hematocrit 43.7 37.5 - 51.0 %    MCV 93 79 - 97 fL    MCH 30.9  26.6 - 33.0 pg    MCHC 33.2 31.5 - 35.7 g/dL    RDW 14.0 12.3 - 15.4 %    Platelets 256 150 - 379 x10E3/uL    Neutrophil Rel % 60 Not Estab. %    Lymphocyte Rel % 27 Not Estab. %    Monocyte Rel % 9 Not Estab. %    Eosinophil Rel % 3 Not Estab. %    Basophil Rel % 1 Not Estab. %    Neutrophils Absolute 3.9 1.4 - 7.0 x10E3/uL    Lymphocytes Absolute 1.7 0.7 - 3.1 x10E3/uL    Monocytes Absolute 0.6 0.1 - 0.9 x10E3/uL    Eosinophils Absolute 0.2 0.0 - 0.4 x10E3/uL    Basophils Absolute 0.1 0.0 - 0.2 x10E3/uL    Immature Granulocyte Rel % 0 Not Estab. %    Immature Grans Absolute 0.0 0.0 - 0.1 x10E3/uL     Ear Cerumen Removal Instrumentation  Date/Time: 4/16/2019 12:33 PM  Performed by: Jackie Yadav MD  Authorized by: Jackie Yadav MD   Consent: Verbal consent obtained.  Risks and benefits: risks, benefits and alternatives were discussed  Consent given by: patient  Patient understanding: patient states understanding of the procedure being performed    Anesthesia:  Local Anesthetic: none  Location details: right ear and left ear  Patient tolerance: Patient tolerated the procedure well with no immediate complications  Procedure type: curette   Sedation:  Patient sedated: no              Jung was seen today for follow-up and hypertension.    Diagnoses and all orders for this visit:    Essential hypertension    Mixed hyperlipidemia    Impaired fasting glucose    Bilateral impacted cerumen    Other orders  -     lisinopril (PRINIVIL,ZESTRIL) 20 MG tablet; Take 1 tablet by mouth Daily.  -     Ear Cerumen Removal Instrumentation      Patient's cholesterol is under good control. Will continue current regimen of Zocor. No side effects from medications reported. Will follow up in 6 months to monitor levels.     HTN is not under good control and patient will continue to monitor with home BP cuff. No side effects from medications. Will continue current regimen of Lisinopril, but increase to 20mg. See back in one week when  I look at his ears.    Patient's BG's have been elevated and their HgA1c is 5.9%. Will continue to focus on diet and exercise. Will follow up in 6 months.     Use Debrox at home and mixture of water and hydrogen peroxide to rinse.If still has issues when I see him for follow up, will send to ENT. He had what looked like cotton balls and wax in his canals and it was stuck to TM.       Return in about 6 months (around 10/16/2019) for Recheck.    Jackie Yadav MD  04/16/2019

## 2019-04-25 ENCOUNTER — OFFICE VISIT (OUTPATIENT)
Dept: INTERNAL MEDICINE | Facility: CLINIC | Age: 74
End: 2019-04-25

## 2019-04-25 VITALS
OXYGEN SATURATION: 96 % | BODY MASS INDEX: 30.21 KG/M2 | HEART RATE: 71 BPM | TEMPERATURE: 97.7 F | RESPIRATION RATE: 16 BRPM | WEIGHT: 211 LBS | HEIGHT: 70 IN | DIASTOLIC BLOOD PRESSURE: 78 MMHG | SYSTOLIC BLOOD PRESSURE: 140 MMHG

## 2019-04-25 DIAGNOSIS — H61.23 BILATERAL IMPACTED CERUMEN: ICD-10-CM

## 2019-04-25 DIAGNOSIS — I10 ESSENTIAL HYPERTENSION: Primary | ICD-10-CM

## 2019-04-25 PROCEDURE — 99213 OFFICE O/P EST LOW 20 MIN: CPT | Performed by: INTERNAL MEDICINE

## 2019-04-25 RX ORDER — LISINOPRIL 20 MG/1
20 TABLET ORAL DAILY
Qty: 90 TABLET | Refills: 2 | Status: SHIPPED | OUTPATIENT
Start: 2019-04-25 | End: 2019-04-26 | Stop reason: SDUPTHER

## 2019-04-25 NOTE — PROGRESS NOTES
Jung Blount is a 74 y.o. male, who presents with a chief complaint of   Chief Complaint   Patient presents with   • Follow-up     7-10 x day f/u    • Ear Fullness       73 yo M here for follow up of his HTN. If he works out, his BP is better. Taking 20mg of Lisinopril. Checking BP and is 126-150/70-80. Eating lots of salt and fast food. He is feeling well.     He has had bilateral hearing loss and cerumen impaction. Wife has been flushing his ears. Hearing better.          The following portions of the patient's history were reviewed and updated as appropriate: allergies, current medications, past family history, past medical history, past social history, past surgical history and problem list.    Allergies: Patient has no known allergies.    Current Outpatient Medications:   •  B-Complex tablet, Take 1 tablet by mouth daily., Disp: , Rfl:   •  cetirizine (zyrTEC) 10 MG tablet, Take 10 mg by mouth Daily., Disp: , Rfl:   •  lisinopril (PRINIVIL,ZESTRIL) 20 MG tablet, Take 1 tablet by mouth Daily., Disp: 90 tablet, Rfl: 2  •  melatonin 5 MG tablet tablet, Take 5 mg by mouth At Night As Needed., Disp: , Rfl:   •  Multiple Vitamin (MULTI VITAMIN DAILY PO), Take 1 tablet by mouth daily., Disp: , Rfl:   •  Omega-3 Fatty Acids (FISH OIL) 1000 MG capsule capsule, Take 1,000 mg by mouth daily with breakfast., Disp: , Rfl:   •  simvastatin (ZOCOR) 20 MG tablet, Take 1 tablet by mouth Every Night., Disp: 90 tablet, Rfl: 3  Medications Discontinued During This Encounter   Medication Reason   • lisinopril (PRINIVIL,ZESTRIL) 20 MG tablet Reorder       Review of Systems   Constitutional: Negative for chills, fatigue and fever.   HENT: Negative for congestion.    Respiratory: Negative for cough and shortness of breath.    Cardiovascular: Negative for chest pain and palpitations.             /78 (BP Location: Left arm, Patient Position: Sitting, Cuff Size: Large Adult)   Pulse 71   Temp 97.7 °F (36.5 °C) (Oral)    "Resp 16   Ht 177.8 cm (70\")   Wt 95.7 kg (211 lb)   SpO2 96%   BMI 30.28 kg/m²       Physical Exam   Constitutional: He is oriented to person, place, and time. He appears well-developed and well-nourished. No distress.   HENT:   Head: Normocephalic and atraumatic.   Right Ear: Tympanic membrane, external ear and ear canal normal. Decreased hearing is noted.   Left Ear: Tympanic membrane, external ear and ear canal normal. Decreased hearing is noted.   Mouth/Throat: Oropharynx is clear and moist. No oropharyngeal exudate.   Eyes: Conjunctivae are normal. Right eye exhibits no discharge. Left eye exhibits no discharge. No scleral icterus.   Neck: Neck supple.   Pulmonary/Chest: Effort normal.   Lymphadenopathy:     He has no cervical adenopathy.   Neurological: He is alert and oriented to person, place, and time.   Skin: Skin is warm. No rash noted.   Psychiatric: He has a normal mood and affect. His behavior is normal.   Nursing note and vitals reviewed.        Results for orders placed or performed in visit on 10/16/18   Comprehensive Metabolic Panel   Result Value Ref Range    Glucose 110 (H) 65 - 99 mg/dL    BUN 17 8 - 27 mg/dL    Creatinine 1.03 0.76 - 1.27 mg/dL    eGFR Non African Am 70 >59 mL/min/1.73    eGFR African Am 81 >59 mL/min/1.73    BUN/Creatinine Ratio 17 10 - 24    Sodium 143 134 - 144 mmol/L    Potassium 4.9 3.5 - 5.2 mmol/L    Chloride 105 96 - 106 mmol/L    Total CO2 23 20 - 29 mmol/L    Calcium 9.2 8.6 - 10.2 mg/dL    Total Protein 6.8 6.0 - 8.5 g/dL    Albumin 4.5 3.5 - 4.8 g/dL    Globulin 2.3 1.5 - 4.5 g/dL    A/G Ratio 2.0 1.2 - 2.2    Total Bilirubin 0.5 0.0 - 1.2 mg/dL    Alkaline Phosphatase 81 39 - 117 IU/L    AST (SGOT) 42 (H) 0 - 40 IU/L    ALT (SGPT) 34 0 - 44 IU/L   Lipid Panel With LDL / HDL Ratio   Result Value Ref Range    Total Cholesterol 195 100 - 199 mg/dL    Triglycerides 129 0 - 149 mg/dL    HDL Cholesterol 67 >39 mg/dL    VLDL Cholesterol 26 5 - 40 mg/dL    LDL " Cholesterol  102 (H) 0 - 99 mg/dL    LDL/HDL Ratio 1.5 0.0 - 3.6 ratio   Urinalysis With Culture If Indicated - Urine, Clean Catch   Result Value Ref Range    Specific Gravity, UA 1.009 1.005 - 1.030    pH, UA 6.5 5.0 - 7.5    Color, UA Yellow Yellow    Appearance, UA Clear Clear    Leukocytes, UA Negative Negative    Protein Negative Negative/Trace    Glucose, UA Negative Negative    Ketones Negative Negative    Blood, UA Negative Negative    Bilirubin, UA Negative Negative    Urobilinogen, UA 0.2 0.2 - 1.0 mg/dL    Nitrite, UA Negative Negative    Microscopic Examination Comment     Microscopic Examination See below:     Urinalysis Reflex Comment    Hemoglobin A1c   Result Value Ref Range    Hemoglobin A1C 5.9 (H) 4.8 - 5.6 %   Microscopic Examination -   Result Value Ref Range    WBC, UA 0-5 0 - 5 /hpf    RBC, UA None seen 0 - 2 /hpf    Epithelial Cells (non renal) None seen 0 - 10 /hpf    Bacteria, UA None seen None seen/Few   CBC & Differential   Result Value Ref Range    WBC 6.5 3.4 - 10.8 x10E3/uL    RBC 4.69 4.14 - 5.80 x10E6/uL    Hemoglobin 14.5 13.0 - 17.7 g/dL    Hematocrit 43.7 37.5 - 51.0 %    MCV 93 79 - 97 fL    MCH 30.9 26.6 - 33.0 pg    MCHC 33.2 31.5 - 35.7 g/dL    RDW 14.0 12.3 - 15.4 %    Platelets 256 150 - 379 x10E3/uL    Neutrophil Rel % 60 Not Estab. %    Lymphocyte Rel % 27 Not Estab. %    Monocyte Rel % 9 Not Estab. %    Eosinophil Rel % 3 Not Estab. %    Basophil Rel % 1 Not Estab. %    Neutrophils Absolute 3.9 1.4 - 7.0 x10E3/uL    Lymphocytes Absolute 1.7 0.7 - 3.1 x10E3/uL    Monocytes Absolute 0.6 0.1 - 0.9 x10E3/uL    Eosinophils Absolute 0.2 0.0 - 0.4 x10E3/uL    Basophils Absolute 0.1 0.0 - 0.2 x10E3/uL    Immature Granulocyte Rel % 0 Not Estab. %    Immature Grans Absolute 0.0 0.0 - 0.1 x10E3/uL           Jung was seen today for follow-up and ear fullness.    Diagnoses and all orders for this visit:    Essential hypertension    Bilateral impacted cerumen    Other orders  -      lisinopril (PRINIVIL,ZESTRIL) 20 MG tablet; Take 1 tablet by mouth Daily.      HTN is under good control and patient will continue to monitor with home BP cuff. No side effects from medications. Will continue current regimen of Lisinopril 20mg. Will follow up in 6 months      Cerumen impaction has resolved    Return in about 6 months (around 10/25/2019) for Recheck, Medicare Wellness.    Jackie Yadav MD  04/25/2019

## 2019-04-26 RX ORDER — LISINOPRIL 20 MG/1
20 TABLET ORAL DAILY
Qty: 90 TABLET | Refills: 2 | Status: SHIPPED | OUTPATIENT
Start: 2019-04-26 | End: 2019-10-08 | Stop reason: SDUPTHER

## 2019-05-13 ENCOUNTER — OFFICE VISIT (OUTPATIENT)
Dept: INTERNAL MEDICINE | Facility: CLINIC | Age: 74
End: 2019-05-13

## 2019-05-13 VITALS
RESPIRATION RATE: 18 BRPM | SYSTOLIC BLOOD PRESSURE: 132 MMHG | HEART RATE: 84 BPM | BODY MASS INDEX: 30.06 KG/M2 | DIASTOLIC BLOOD PRESSURE: 82 MMHG | HEIGHT: 70 IN | TEMPERATURE: 98.7 F | WEIGHT: 210 LBS | OXYGEN SATURATION: 95 %

## 2019-05-13 DIAGNOSIS — J20.9 ACUTE BRONCHITIS, UNSPECIFIED ORGANISM: Primary | ICD-10-CM

## 2019-05-13 PROCEDURE — 99213 OFFICE O/P EST LOW 20 MIN: CPT | Performed by: INTERNAL MEDICINE

## 2019-05-13 RX ORDER — AZITHROMYCIN 250 MG/1
TABLET, FILM COATED ORAL
COMMUNITY
Start: 2019-05-12 | End: 2019-05-13

## 2019-05-13 RX ORDER — DOXYCYCLINE HYCLATE 100 MG
100 TABLET ORAL 2 TIMES DAILY
Qty: 14 TABLET | Refills: 0 | Status: SHIPPED | OUTPATIENT
Start: 2019-05-13 | End: 2019-05-20

## 2019-05-13 NOTE — PROGRESS NOTES
Jung Blount is a 74 y.o. male, who presents with a chief complaint of   Chief Complaint   Patient presents with   • Nasal Congestion   • Cough   • Chest Pain       73 yo M here for acute visit and has been sick since Friday. He has been coughing and sputum is yellow. No fever. No chills. No myalgia. 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 Wife is also sick.             The following portions of the patient's history were reviewed and updated as appropriate: allergies, current medications, past family history, past medical history, past social history, past surgical history and problem list.    Allergies: Patient has no known allergies.    Current Outpatient Medications:   •  B-Complex tablet, Take 1 tablet by mouth daily., Disp: , Rfl:   •  Cyanocobalamin (VITAMIN B 12 PO), Take 1 tablet by mouth Daily., Disp: , Rfl:   •  lisinopril (PRINIVIL,ZESTRIL) 20 MG tablet, Take 1 tablet by mouth Daily., Disp: 90 tablet, Rfl: 2  •  Multiple Vitamin (MULTI VITAMIN DAILY PO), Take 1 tablet by mouth daily., Disp: , Rfl:   •  Omega-3 Fatty Acids (FISH OIL) 1000 MG capsule capsule, Take 1,000 mg by mouth daily with breakfast., Disp: , Rfl:   •  simvastatin (ZOCOR) 20 MG tablet, Take 1 tablet by mouth Every Night., Disp: 90 tablet, Rfl: 3  •  doxycycline (VIBRAMYICN) 100 MG tablet, Take 1 tablet by mouth 2 (Two) Times a Day for 7 days., Disp: 14 tablet, Rfl: 0  •  HYDROcodone-homatropine (HYCODAN) 5-1.5 MG/5ML syrup, Take 5 mL by mouth Every 8 (Eight) Hours As Needed for Cough., Disp: 120 mL, Rfl: 0  Medications Discontinued During This Encounter   Medication Reason   • cetirizine (zyrTEC) 10 MG tablet *Therapy completed   • melatonin 5 MG tablet tablet *Therapy completed   • azithromycin (ZITHROMAX) 250 MG tablet *Therapy completed       Review of Systems   Constitutional: Positive for chills and fatigue.   HENT: Positive for congestion and voice change. Negative for sore throat.    Respiratory:  "Positive for cough.    Gastrointestinal: Negative for diarrhea and nausea.             /82 (BP Location: Right arm, Patient Position: Sitting, Cuff Size: Adult)   Pulse 84   Temp 98.7 °F (37.1 °C) (Oral)   Resp 18   Ht 177.8 cm (70\")   Wt 95.3 kg (210 lb)   SpO2 95%   BMI 30.13 kg/m²       Physical Exam   Constitutional: He is oriented to person, place, and time. He appears well-developed and well-nourished. No distress.   HENT:   Head: Normocephalic and atraumatic.   Right Ear: Hearing, tympanic membrane, external ear and ear canal normal.   Left Ear: Hearing, tympanic membrane, external ear and ear canal normal.   Nose: Nose normal.   Mouth/Throat: Uvula is midline and mucous membranes are normal. Posterior oropharyngeal edema and posterior oropharyngeal erythema present. No oropharyngeal exudate. Tonsils are 0 on the right. Tonsils are 0 on the left.   Eyes: Conjunctivae are normal. Right eye exhibits no discharge. Left eye exhibits no discharge. No scleral icterus.   Neck: Neck supple.   Cardiovascular: Normal rate, regular rhythm and normal heart sounds. Exam reveals no gallop and no friction rub.   No murmur heard.  Pulmonary/Chest: Effort normal and breath sounds normal. No respiratory distress. He has no wheezes. He has no rales.   Lymphadenopathy:     He has no cervical adenopathy.   Neurological: He is alert and oriented to person, place, and time.   Skin: Skin is warm. No rash noted.   Psychiatric: He has a normal mood and affect. His behavior is normal.   Nursing note and vitals reviewed.          Results for orders placed or performed in visit on 10/16/18   Comprehensive Metabolic Panel   Result Value Ref Range    Glucose 110 (H) 65 - 99 mg/dL    BUN 17 8 - 27 mg/dL    Creatinine 1.03 0.76 - 1.27 mg/dL    eGFR Non African Am 70 >59 mL/min/1.73    eGFR African Am 81 >59 mL/min/1.73    BUN/Creatinine Ratio 17 10 - 24    Sodium 143 134 - 144 mmol/L    Potassium 4.9 3.5 - 5.2 mmol/L    Chloride " 105 96 - 106 mmol/L    Total CO2 23 20 - 29 mmol/L    Calcium 9.2 8.6 - 10.2 mg/dL    Total Protein 6.8 6.0 - 8.5 g/dL    Albumin 4.5 3.5 - 4.8 g/dL    Globulin 2.3 1.5 - 4.5 g/dL    A/G Ratio 2.0 1.2 - 2.2    Total Bilirubin 0.5 0.0 - 1.2 mg/dL    Alkaline Phosphatase 81 39 - 117 IU/L    AST (SGOT) 42 (H) 0 - 40 IU/L    ALT (SGPT) 34 0 - 44 IU/L   Lipid Panel With LDL / HDL Ratio   Result Value Ref Range    Total Cholesterol 195 100 - 199 mg/dL    Triglycerides 129 0 - 149 mg/dL    HDL Cholesterol 67 >39 mg/dL    VLDL Cholesterol 26 5 - 40 mg/dL    LDL Cholesterol  102 (H) 0 - 99 mg/dL    LDL/HDL Ratio 1.5 0.0 - 3.6 ratio   Urinalysis With Culture If Indicated - Urine, Clean Catch   Result Value Ref Range    Specific Gravity, UA 1.009 1.005 - 1.030    pH, UA 6.5 5.0 - 7.5    Color, UA Yellow Yellow    Appearance, UA Clear Clear    Leukocytes, UA Negative Negative    Protein Negative Negative/Trace    Glucose, UA Negative Negative    Ketones Negative Negative    Blood, UA Negative Negative    Bilirubin, UA Negative Negative    Urobilinogen, UA 0.2 0.2 - 1.0 mg/dL    Nitrite, UA Negative Negative    Microscopic Examination Comment     Microscopic Examination See below:     Urinalysis Reflex Comment    Hemoglobin A1c   Result Value Ref Range    Hemoglobin A1C 5.9 (H) 4.8 - 5.6 %   Microscopic Examination -   Result Value Ref Range    WBC, UA 0-5 0 - 5 /hpf    RBC, UA None seen 0 - 2 /hpf    Epithelial Cells (non renal) None seen 0 - 10 /hpf    Bacteria, UA None seen None seen/Few   CBC & Differential   Result Value Ref Range    WBC 6.5 3.4 - 10.8 x10E3/uL    RBC 4.69 4.14 - 5.80 x10E6/uL    Hemoglobin 14.5 13.0 - 17.7 g/dL    Hematocrit 43.7 37.5 - 51.0 %    MCV 93 79 - 97 fL    MCH 30.9 26.6 - 33.0 pg    MCHC 33.2 31.5 - 35.7 g/dL    RDW 14.0 12.3 - 15.4 %    Platelets 256 150 - 379 x10E3/uL    Neutrophil Rel % 60 Not Estab. %    Lymphocyte Rel % 27 Not Estab. %    Monocyte Rel % 9 Not Estab. %    Eosinophil Rel % 3  Not Estab. %    Basophil Rel % 1 Not Estab. %    Neutrophils Absolute 3.9 1.4 - 7.0 x10E3/uL    Lymphocytes Absolute 1.7 0.7 - 3.1 x10E3/uL    Monocytes Absolute 0.6 0.1 - 0.9 x10E3/uL    Eosinophils Absolute 0.2 0.0 - 0.4 x10E3/uL    Basophils Absolute 0.1 0.0 - 0.2 x10E3/uL    Immature Granulocyte Rel % 0 Not Estab. %    Immature Grans Absolute 0.0 0.0 - 0.1 x10E3/uL           Jung was seen today for nasal congestion, cough and chest pain.    Diagnoses and all orders for this visit:    Acute bronchitis, unspecified organism    Other orders  -     HYDROcodone-homatropine (HYCODAN) 5-1.5 MG/5ML syrup; Take 5 mL by mouth Every 8 (Eight) Hours As Needed for Cough.  -     doxycycline (VIBRAMYICN) 100 MG tablet; Take 1 tablet by mouth 2 (Two) Times a Day for 7 days.      Doxycycline twice per day for 7 days   Mucinex during the day   Special cough syrup at night   No steroids today, but if not better, may consider in a week   Discussed that cough may last up to 6 weeks       Return if symptoms worsen or fail to improve.    Jackie Yadav MD  05/13/2019

## 2019-08-19 DIAGNOSIS — R73.01 IMPAIRED FASTING GLUCOSE: ICD-10-CM

## 2019-08-19 DIAGNOSIS — I10 ESSENTIAL HYPERTENSION: Primary | ICD-10-CM

## 2019-08-19 DIAGNOSIS — E78.2 MIXED HYPERLIPIDEMIA: ICD-10-CM

## 2019-09-23 ENCOUNTER — TELEPHONE (OUTPATIENT)
Dept: INTERNAL MEDICINE | Facility: CLINIC | Age: 74
End: 2019-09-23

## 2019-09-23 NOTE — TELEPHONE ENCOUNTER
----- Message from Mery Zuleta sent at 9/23/2019  2:11 PM EDT -----  Contact: spouse / kim Yadav pt    Please fax lab orders to [facility] in [facility]- off of [address] - fax number is   [phone]  asap so they can have them done

## 2019-09-23 NOTE — TELEPHONE ENCOUNTER
Lab orders have been in chart since last month, [facility] can see them. But I just went ahead and faxed them.

## 2019-10-02 LAB
ALBUMIN SERPL-MCNC: 4.3 G/DL (ref 3.5–5.2)
ALBUMIN/GLOB SERPL: 1.6 G/DL
ALP SERPL-CCNC: 78 U/L (ref 39–117)
ALT SERPL-CCNC: 29 U/L (ref 1–41)
APPEARANCE UR: CLEAR
AST SERPL-CCNC: 29 U/L (ref 1–40)
BACTERIA #/AREA URNS HPF: ABNORMAL /HPF
BASOPHILS # BLD AUTO: 0.06 10*3/MM3 (ref 0–0.2)
BASOPHILS NFR BLD AUTO: 1 % (ref 0–1.5)
BILIRUB SERPL-MCNC: 0.5 MG/DL (ref 0.2–1.2)
BILIRUB UR QL STRIP: NEGATIVE
BUN SERPL-MCNC: 17 MG/DL (ref 8–23)
BUN/CREAT SERPL: 13.2 (ref 7–25)
CALCIUM SERPL-MCNC: 9.2 MG/DL (ref 8.6–10.5)
CASTS URNS MICRO: ABNORMAL
CASTS URNS QL MICRO: PRESENT /LPF
CHLORIDE SERPL-SCNC: 100 MMOL/L (ref 98–107)
CHOLEST SERPL-MCNC: 166 MG/DL (ref 0–200)
CO2 SERPL-SCNC: 27.7 MMOL/L (ref 22–29)
COLOR UR: YELLOW
CREAT SERPL-MCNC: 1.29 MG/DL (ref 0.76–1.27)
EOSINOPHIL # BLD AUTO: 0.21 10*3/MM3 (ref 0–0.4)
EOSINOPHIL NFR BLD AUTO: 3.6 % (ref 0.3–6.2)
EPI CELLS #/AREA URNS HPF: ABNORMAL /HPF
ERYTHROCYTE [DISTWIDTH] IN BLOOD BY AUTOMATED COUNT: 12.8 % (ref 12.3–15.4)
GLOBULIN SER CALC-MCNC: 2.7 GM/DL
GLUCOSE SERPL-MCNC: 108 MG/DL (ref 65–99)
GLUCOSE UR QL: NEGATIVE
HBA1C MFR BLD: 5.9 % (ref 4.8–5.6)
HCT VFR BLD AUTO: 43.7 % (ref 37.5–51)
HDLC SERPL-MCNC: 63 MG/DL (ref 40–60)
HGB BLD-MCNC: 14.2 G/DL (ref 13–17.7)
HGB UR QL STRIP: NEGATIVE
IMM GRANULOCYTES # BLD AUTO: 0.02 10*3/MM3 (ref 0–0.05)
IMM GRANULOCYTES NFR BLD AUTO: 0.3 % (ref 0–0.5)
KETONES UR QL STRIP: NEGATIVE
LDLC SERPL CALC-MCNC: 80 MG/DL (ref 0–100)
LDLC/HDLC SERPL: 1.28 {RATIO}
LEUKOCYTE ESTERASE UR QL STRIP: NEGATIVE
LYMPHOCYTES # BLD AUTO: 1.79 10*3/MM3 (ref 0.7–3.1)
LYMPHOCYTES NFR BLD AUTO: 30.6 % (ref 19.6–45.3)
MCH RBC QN AUTO: 30.6 PG (ref 26.6–33)
MCHC RBC AUTO-ENTMCNC: 32.5 G/DL (ref 31.5–35.7)
MCV RBC AUTO: 94.2 FL (ref 79–97)
MICRO URNS: NORMAL
MICRO URNS: NORMAL
MONOCYTES # BLD AUTO: 0.48 10*3/MM3 (ref 0.1–0.9)
MONOCYTES NFR BLD AUTO: 8.2 % (ref 5–12)
MUCOUS THREADS URNS QL MICRO: PRESENT /HPF
NEUTROPHILS # BLD AUTO: 3.29 10*3/MM3 (ref 1.7–7)
NEUTROPHILS NFR BLD AUTO: 56.3 % (ref 42.7–76)
NITRITE UR QL STRIP: NEGATIVE
NRBC BLD AUTO-RTO: 0 /100 WBC (ref 0–0.2)
PH UR STRIP: 5.5 [PH] (ref 5–7.5)
PLATELET # BLD AUTO: 286 10*3/MM3 (ref 140–450)
POTASSIUM SERPL-SCNC: 4.3 MMOL/L (ref 3.5–5.2)
PROT SERPL-MCNC: 7 G/DL (ref 6–8.5)
PROT UR QL STRIP: NEGATIVE
RBC # BLD AUTO: 4.64 10*6/MM3 (ref 4.14–5.8)
RBC #/AREA URNS HPF: ABNORMAL /HPF
SODIUM SERPL-SCNC: 141 MMOL/L (ref 136–145)
SP GR UR: 1.02 (ref 1–1.03)
TRIGL SERPL-MCNC: 113 MG/DL (ref 0–150)
URINALYSIS REFLEX: NORMAL
UROBILINOGEN UR STRIP-MCNC: 0.2 MG/DL (ref 0.2–1)
VLDLC SERPL CALC-MCNC: 22.6 MG/DL
WBC # BLD AUTO: 5.85 10*3/MM3 (ref 3.4–10.8)
WBC #/AREA URNS HPF: ABNORMAL /HPF

## 2019-10-08 ENCOUNTER — OFFICE VISIT (OUTPATIENT)
Dept: INTERNAL MEDICINE | Facility: CLINIC | Age: 74
End: 2019-10-08

## 2019-10-08 VITALS
WEIGHT: 202 LBS | RESPIRATION RATE: 16 BRPM | OXYGEN SATURATION: 98 % | HEART RATE: 79 BPM | HEIGHT: 70 IN | DIASTOLIC BLOOD PRESSURE: 86 MMHG | SYSTOLIC BLOOD PRESSURE: 122 MMHG | BODY MASS INDEX: 28.92 KG/M2 | TEMPERATURE: 98.1 F

## 2019-10-08 DIAGNOSIS — R97.20 ELEVATED PSA, LESS THAN 10 NG/ML: ICD-10-CM

## 2019-10-08 DIAGNOSIS — R73.01 IMPAIRED FASTING GLUCOSE: ICD-10-CM

## 2019-10-08 DIAGNOSIS — N18.30 CKD (CHRONIC KIDNEY DISEASE) STAGE 3, GFR 30-59 ML/MIN (HCC): ICD-10-CM

## 2019-10-08 DIAGNOSIS — E78.2 MIXED HYPERLIPIDEMIA: ICD-10-CM

## 2019-10-08 DIAGNOSIS — I10 ESSENTIAL HYPERTENSION: Primary | ICD-10-CM

## 2019-10-08 PROCEDURE — 99214 OFFICE O/P EST MOD 30 MIN: CPT | Performed by: INTERNAL MEDICINE

## 2019-10-08 RX ORDER — LISINOPRIL 10 MG/1
20 TABLET ORAL DAILY
Qty: 90 TABLET | Refills: 2 | Status: SHIPPED | OUTPATIENT
Start: 2019-10-08 | End: 2019-10-08 | Stop reason: SDUPTHER

## 2019-10-08 RX ORDER — LISINOPRIL 10 MG/1
10 TABLET ORAL DAILY
Qty: 90 TABLET | Refills: 2 | Status: SHIPPED | OUTPATIENT
Start: 2019-10-08 | End: 2020-12-16

## 2019-10-17 RX ORDER — SIMVASTATIN 20 MG
TABLET ORAL
Qty: 90 TABLET | Refills: 3 | OUTPATIENT
Start: 2019-10-17

## 2019-10-24 ENCOUNTER — TELEPHONE (OUTPATIENT)
Dept: INTERNAL MEDICINE | Facility: CLINIC | Age: 74
End: 2019-10-24

## 2019-10-24 RX ORDER — SIMVASTATIN 20 MG
20 TABLET ORAL NIGHTLY
Qty: 90 TABLET | Refills: 3 | Status: SHIPPED | OUTPATIENT
Start: 2019-10-24

## 2019-10-24 NOTE — TELEPHONE ENCOUNTER
Rx sent in as per below.  Patient advised.    ----- Message from Rupa Jerome sent at 10/24/2019 10:02 AM EDT -----  Contact: YONNY HU  PATIENT NEEDS A REFILL ON SIMVASTATIN 20 MG TABLET SENT TO [facility]IL [facility]. PATIENT WOULD LIKE 90 DAYS X3.    PHONE # [phone]

## 2019-11-06 ENCOUNTER — TELEPHONE (OUTPATIENT)
Dept: INTERNAL MEDICINE | Facility: CLINIC | Age: 74
End: 2019-11-06

## 2019-11-06 LAB — PSA SERPL-MCNC: 2.82 NG/ML (ref 0–4)

## 2020-01-15 ENCOUNTER — OFFICE VISIT (OUTPATIENT)
Dept: ORTHOPEDIC SURGERY | Facility: CLINIC | Age: 75
End: 2020-01-15

## 2020-01-15 VITALS — BODY MASS INDEX: 28.83 KG/M2 | WEIGHT: 201.4 LBS | HEIGHT: 70 IN | TEMPERATURE: 98.3 F

## 2020-01-15 DIAGNOSIS — N18.30 CKD (CHRONIC KIDNEY DISEASE) STAGE 3, GFR 30-59 ML/MIN (HCC): ICD-10-CM

## 2020-01-15 DIAGNOSIS — M25.551 RIGHT HIP PAIN: Primary | ICD-10-CM

## 2020-01-15 DIAGNOSIS — M53.3 SI (SACROILIAC) JOINT DYSFUNCTION: ICD-10-CM

## 2020-01-15 PROCEDURE — 73502 X-RAY EXAM HIP UNI 2-3 VIEWS: CPT | Performed by: ORTHOPAEDIC SURGERY

## 2020-01-15 PROCEDURE — 20610 DRAIN/INJ JOINT/BURSA W/O US: CPT | Performed by: ORTHOPAEDIC SURGERY

## 2020-01-15 PROCEDURE — 99204 OFFICE O/P NEW MOD 45 MIN: CPT | Performed by: ORTHOPAEDIC SURGERY

## 2020-01-15 RX ORDER — METHYLPREDNISOLONE ACETATE 80 MG/ML
80 INJECTION, SUSPENSION INTRA-ARTICULAR; INTRALESIONAL; INTRAMUSCULAR; SOFT TISSUE
Status: COMPLETED | OUTPATIENT
Start: 2020-01-15 | End: 2020-01-15

## 2020-01-15 RX ADMIN — METHYLPREDNISOLONE ACETATE 80 MG: 80 INJECTION, SUSPENSION INTRA-ARTICULAR; INTRALESIONAL; INTRAMUSCULAR; SOFT TISSUE at 15:16

## 2020-01-15 NOTE — PROGRESS NOTES
"Patient Name: Jung Blount   YOB: 1945  Referring Primary Care Physician: Jackie Yadav MD  BMI: Body mass index is 28.9 kg/m².    Chief Complaint:    Chief Complaint   Patient presents with   • Right Hip - Establish Care, Pain        HPI:     Jung Blount is a 74 y.o. male who presents today for evaluation of   Chief Complaint   Patient presents with   • Right Hip - Establish Care, Pain   .  Patient is seen today complaining of \"right hip pain\".  He seen with Karen Blount who is his wife.  Says he is had the pain going on about 6 months it hurts over his right sacroiliac joint.  Is very sore in the morning he gets going better in the afternoon.  He cannot take anti-inflammatories because he is stage III kidney disease and is also had liver problems with multiple liver biopsies and CTs as he had abscesses in them but says that they are better at this point this was a year or 2 ago.  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  He is very hard of hearing wears a hearing aid.    This problem is new to this examiner.     Subjective   Medications:   Home Medications:  Current Outpatient Medications on File Prior to Visit   Medication Sig   • B-Complex tablet Take 1 tablet by mouth daily.   • Cyanocobalamin (VITAMIN B 12 PO) Take 1 tablet by mouth Daily.   • lisinopril (PRINIVIL,ZESTRIL) 10 MG tablet Take 1 tablet by mouth Daily.   • Multiple Vitamin (MULTI VITAMIN DAILY PO) Take 1 tablet by mouth daily.   • Omega-3 Fatty Acids (FISH OIL) 1000 MG capsule capsule Take 1,000 mg by mouth daily with breakfast.   • simvastatin (ZOCOR) 20 MG tablet Take 1 tablet by mouth Every Night.     No current facility-administered medications on file prior to visit.      Current Medications:  Scheduled Meds:  Continuous Infusions:  No current facility-administered medications for this visit.   PRN Meds:.    I have reviewed the patient's medical history in detail and updated the computerized " patient record.  Review and summarization of old records includes:    Past Medical History:   Diagnosis Date   • Diverticula of colon 8/24/2017   • Hyperlipidemia    • Hypertension    • Skin cancer         Past Surgical History:   Procedure Laterality Date   • COLONOSCOPY  2008    Dr Pan Morales   • ROTATOR CUFF REPAIR Bilateral     L - 1991 Dr Justin; R - 2007 Dr Ledbetter   • SHOULDER SURGERY Bilateral     1st 1991 2nd 2008        Social History     Occupational History   • Not on file   Tobacco Use   • Smoking status: Former Smoker     Packs/day: 2.00     Years: 47.00     Pack years: 94.00   • Smokeless tobacco: Never Used   • Tobacco comment: quit July 2007   Substance and Sexual Activity   • Alcohol use: Yes     Comment: Treated for alcoholism in 1991   • Drug use: Defer   • Sexual activity: Defer    Social History     Social History Narrative   • Not on file        Family History   Problem Relation Age of Onset   • Stroke Mother    • Cancer Father         kidney   • Kidney disease Father    • Alcohol abuse Father    • Hypertension Sister    • Cancer Sister        ROS: 14 point review of systems was performed and all other systems were reviewed and are negative except for documented findings in HPI and today's encounter.     Allergies: No Known Allergies  Constitutional:  Denies fever, shaking or chills   Eyes:  Denies change in visual acuity   HENT:  Denies nasal congestion or sore throat   Respiratory:  Denies cough or shortness of breath   Cardiovascular:  Denies chest pain or severe LE edema   GI:  Denies abdominal pain, nausea, vomiting, bloody stools or diarrhea   Musculoskeletal:  Numbness, tingling, pain, or loss of motor function only as noted above in history of present illness.  : Denies painful urination or hematuria  Integument:  Denies rash, lesion or ulceration   Neurologic:  Denies headache or focal weakness  Endocrine:  Denies lymphadenopathy  Psych:  Denies confusion or change in mental  "status   Hem:  Denies active bleeding    OBJECTIVE:  Physical Exam: 74 y.o. male  Wt Readings from Last 3 Encounters:   01/15/20 91.4 kg (201 lb 6.4 oz)   10/08/19 91.6 kg (202 lb)   05/13/19 95.3 kg (210 lb)     Ht Readings from Last 1 Encounters:   01/15/20 177.8 cm (70\")     Body mass index is 28.9 kg/m².  Vitals:    01/15/20 1443   Temp: 98.3 °F (36.8 °C)     Vital signs reviewed.     General Appearance:    Alert, cooperative, in no acute distress                  Eyes: conjunctiva clear  ENT: external ears and nose atraumatic  CV: no peripheral edema  Resp: normal respiratory effort  Skin: no rashes or wounds; normal turgor  Psych: mood and affect appropriate  Lymph: no nodes appreciated  Neuro: gross sensation intact  Vascular:  Palpable peripheral pulse in noted extremity  Musculoskeletal Extremities: Pleasant gentleman seen today appears to be very physically fit he is tender right over the SI joint with Marysol testing Stinchfield is negative bilaterally although he has stiffness in his hips and he walks with a slightly forward flexed posture.  He is not tender over his trochanter or his ischio to direct palpation.    Radiology:   AP of the hips lateral right hip viewed today also reviewed old x-rays including CT scans and old records from [facility] and ED he has moderate arthritic change in his hip on today's x-ray without comparison view.  The visualized inferior portions of the SI joint do show evidence of degeneration.  On the AP looks like there is a encapsulated area over his issue him however it is really not present in the lateral and may be artifact or old trauma.  Nonetheless he has no symptoms in that area and is not tender to palpation.    Assessment:     ICD-10-CM ICD-9-CM   1. Right hip pain M25.551 719.45   2. SI (sacroiliac) joint dysfunction M53.3 724.6        Large Joint Arthrocentesis  Date/Time: 1/15/2020 3:16 PM  Consent given by: patient  Site marked: site marked  Timeout: " Immediately prior to procedure a time out was called to verify the correct patient, procedure, equipment, support staff and site/side marked as required   Supporting Documentation  Indications: pain and joint swelling   Procedure Details  Location: hip - Hip joint: right si joint   Preparation: Patient was prepped and draped in the usual sterile fashion  Needle gauge: 21.  Approach: anterolateral  Medications administered: 80 mg methylPREDNISolone acetate 80 MG/ML; 4 mL lidocaine (cardiac)  Patient tolerance: patient tolerated the procedure well with no immediate complications             Plan: Biomechanics of pertinent body area discussed.  Risks, benefits, alternatives, comparisons, and complications of accepted medicines, injections, recommendations, surgical procedures, and therapies explained and education provided in laymen's terms. Natural history and expected course of this patient's diagnosis discussed along with evaluation of therapies. Questions answered. When appropriate I also discussed proper use of cane, walker, trekking poles.   Cortisone Injection. See procedure note.  I talked to him about it I believe its trouble in his sacroiliac joint.  I offered to inject it and he accepted.  He had almost immediate anesthetic effect with the injection it took care of all of his symptoms we will see how this does bother to him it can certainly be repeated if he gets pain in his pelvis we can order MRI in that area as well.      1/15/2020    Much of this encounter note is an electronic transcription/translation of spoken language to printed text. The electronic translation of spoken language may permit erroneous, or at times, nonsensical words or phrases to be inadvertently transcribed; Although I have reviewed the note for such errors, some may still exist

## 2020-12-16 ENCOUNTER — CLINICAL SUPPORT (OUTPATIENT)
Dept: ORTHOPEDIC SURGERY | Facility: CLINIC | Age: 75
End: 2020-12-16

## 2020-12-16 VITALS — WEIGHT: 201.5 LBS | BODY MASS INDEX: 28.85 KG/M2 | TEMPERATURE: 97 F | HEIGHT: 70 IN

## 2020-12-16 DIAGNOSIS — M53.3 SI (SACROILIAC) JOINT DYSFUNCTION: ICD-10-CM

## 2020-12-16 DIAGNOSIS — M25.551 RIGHT HIP PAIN: Primary | ICD-10-CM

## 2020-12-16 PROCEDURE — 20610 DRAIN/INJ JOINT/BURSA W/O US: CPT | Performed by: NURSE PRACTITIONER

## 2020-12-16 PROCEDURE — 73501 X-RAY EXAM HIP UNI 1 VIEW: CPT | Performed by: NURSE PRACTITIONER

## 2020-12-16 PROCEDURE — 99213 OFFICE O/P EST LOW 20 MIN: CPT | Performed by: NURSE PRACTITIONER

## 2020-12-16 RX ORDER — LISINOPRIL 20 MG/1
TABLET ORAL
COMMUNITY

## 2020-12-16 RX ORDER — METHYLPREDNISOLONE ACETATE 80 MG/ML
80 INJECTION, SUSPENSION INTRA-ARTICULAR; INTRALESIONAL; INTRAMUSCULAR; SOFT TISSUE
Status: COMPLETED | OUTPATIENT
Start: 2020-12-16 | End: 2020-12-16

## 2020-12-16 RX ADMIN — METHYLPREDNISOLONE ACETATE 80 MG: 80 INJECTION, SUSPENSION INTRA-ARTICULAR; INTRALESIONAL; INTRAMUSCULAR; SOFT TISSUE at 09:13

## 2020-12-16 NOTE — PROGRESS NOTES
Patient Name: Jung Blount   YOB: 1945  Referring Primary Care Physician: Jackie Yadav MD  BMI: Body mass index is 28.91 kg/m².    Chief Complaint:    Chief Complaint   Patient presents with   • Right Hip - Injections        HPI:     Jung Blount is a 75 y.o. male who presents today for evaluation of   Chief Complaint   Patient presents with   • Right Hip - Injections   .  Patient presents complaining of pain in his right hip/back.  He was evaluated by Dr. Margo an in January and had his right sacroiliac joint injected.  He immediately had relief and was pain-free for about 6 months.  In February they moved to [place] so in October when his pain returned he saw an orthopedic surgeon there who gave him a cortisone injection that gave him no relief in his pain.  He is unsure if it was in his SI joint or his right trochanteric bursa.  He continues to have an achy pain in his right SI joint.  It is worse in the morning and improves throughout the day.  He cannot take any type of anti-inflammatories due to kidney disease.  He is here today requesting repeat cortisone injection.      Subjective   Medications:   Home Medications:  Current Outpatient Medications on File Prior to Visit   Medication Sig   • B-Complex tablet Take 1 tablet by mouth daily.   • Cyanocobalamin (VITAMIN B 12 PO) Take 1 tablet by mouth Daily.   • lisinopril (PRINIVIL,ZESTRIL) 20 MG tablet lisinopril 20 mg tablet   • Multiple Vitamin (MULTI VITAMIN DAILY PO) Take 1 tablet by mouth daily.   • Omega-3 Fatty Acids (FISH OIL) 1000 MG capsule capsule Take 1,000 mg by mouth daily with breakfast.   • simvastatin (ZOCOR) 20 MG tablet Take 1 tablet by mouth Every Night.   • [DISCONTINUED] lisinopril (PRINIVIL,ZESTRIL) 10 MG tablet Take 1 tablet by mouth Daily.     No current facility-administered medications on file prior to visit.      Current Medications:  Scheduled Meds:  Continuous Infusions:No current facility-administered  medications for this visit.     PRN Meds:.    I have reviewed the patient's medical history in detail and updated the computerized patient record.  Review and summarization of old records includes:    Past Medical History:   Diagnosis Date   • Diverticula of colon 8/24/2017   • Hyperlipidemia    • Hypertension    • Skin cancer         Past Surgical History:   Procedure Laterality Date   • COLONOSCOPY  2008    Dr Pan Morales   • ROTATOR CUFF REPAIR Bilateral     L - 1991 Dr Justin; R - 2007 Dr Ledbetter   • SHOULDER SURGERY Bilateral     1st 1991 2nd 2008        Social History     Occupational History   • Not on file   Tobacco Use   • Smoking status: Former Smoker     Packs/day: 2.00     Years: 47.00     Pack years: 94.00   • Smokeless tobacco: Never Used   • Tobacco comment: quit July 2007   Substance and Sexual Activity   • Alcohol use: Yes     Comment: Treated for alcoholism in 1991   • Drug use: Defer   • Sexual activity: Defer      Social History     Social History Narrative   • Not on file        Family History   Problem Relation Age of Onset   • Stroke Mother    • Cancer Father         kidney   • Kidney disease Father    • Alcohol abuse Father    • Hypertension Sister    • Cancer Sister        ROS: 14 point review of systems was performed and all other systems were reviewed and are negative except for documented findings in HPI and today's encounter.     Allergies: No Known Allergies  Constitutional:  Denies fever, shaking or chills   Eyes:  Denies change in visual acuity   HENT:  Denies nasal congestion or sore throat   Respiratory:  Denies cough or shortness of breath   Cardiovascular:  Denies chest pain or severe LE edema   GI:  Denies abdominal pain, nausea, vomiting, bloody stools or diarrhea   Musculoskeletal:  Numbness, tingling, pain, or loss of motor function only as noted above in history of present illness.  : Denies painful urination or hematuria  Integument:  Denies rash, lesion or ulceration  "  Neurologic:  Denies headache or focal weakness  Endocrine:  Denies lymphadenopathy  Psych:  Denies confusion or change in mental status   Hem:  Denies active bleeding    OBJECTIVE:  Physical Exam: 75 y.o. male  Wt Readings from Last 3 Encounters:   12/16/20 91.4 kg (201 lb 8 oz)   01/15/20 91.4 kg (201 lb 6.4 oz)   10/08/19 91.6 kg (202 lb)     Ht Readings from Last 1 Encounters:   12/16/20 177.8 cm (70\")     Body mass index is 28.91 kg/m².  Vitals:    12/16/20 1340   Temp: 97 °F (36.1 °C)     Vital signs reviewed.     General Appearance:    Alert, cooperative, in no acute distress                  Eyes: conjunctiva clear  ENT: external ears and nose atraumatic  CV: no peripheral edema  Resp: normal respiratory effort  Skin: no rashes or wounds; normal turgor  Psych: mood and affect appropriate  Lymph: no nodes appreciated  Neuro: gross sensation intact  Vascular:  Palpable peripheral pulse in noted extremity  Musculoskeletal Extremities: Stinchfield negative on the right, logroll negative, no pain with internal and external rotation of the hip.  No tenderness over right rater trochanter.  Tenderness to palpation of right SI joint.  Calves soft, nontender to palpation.  Lower extremities neurovascularly intact.  Ambulates with normal gait.    Radiology:   AP pelvis lateral right hip obtained in office today due to pain with prior comparisons show moderate arthritic changes in the hip with degeneration of SI joint    Assessment:     ICD-10-CM ICD-9-CM   1. Right hip pain  M25.551 719.45   2. SI (sacroiliac) joint dysfunction  M53.3 724.6        Large Joint Arthrocentesis  Date/Time: 12/16/2020 9:13 AM  Consent given by: patient  Site marked: site marked  Timeout: Immediately prior to procedure a time out was called to verify the correct patient, procedure, equipment, support staff and site/side marked as required   Supporting Documentation  Indications: pain   Procedure Details  Location: right SI " joint.  Preparation: Patient was prepped and draped in the usual sterile fashion  Needle gauge: 21.  Medications administered: 4 mL lidocaine (cardiac); 80 mg methylPREDNISolone acetate 80 MG/ML  Patient tolerance: patient tolerated the procedure well with no immediate complications             Plan: The diagnosis(es), natural history, pathophysiology and treatment for diagnosis(es) were discussed. Opportunity given and questions answered.  Biomechanics of pertinent body areas discussed.  When appropriate, the use of ambulatory aids discussed.  EXERCISES:  Advice on benefits of, and types of regular/moderate exercise pertaining to orthopedic diagnosis(es).  Cortisone Injection. See procedure note.  His injection in [place] was done around 10/1 so it has not been a full 3 months.  However, patient is unsure if the injection was in his right trochanteric bursa or his SI joint.  Given that it has been at least 10 weeks we proceeded with right SI joint injection.  Patient immediately had numbing/relief in his pain after receiving injection.  We discussed that this pain is coming from his lower back and if he continues to have discomfort he should see either sports medicine or a back doctor to further evaluate his pain. Will see him back as needed.      12/16/2020    Much of this encounter note is an electronic transcription/translation of spoken language to printed text. The electronic translation of spoken language may permit erroneous, or at times, nonsensical words or phrases to be inadvertently transcribed; Although I have reviewed the note for such errors, some may still exist